# Patient Record
Sex: MALE | Race: WHITE | Employment: OTHER | URBAN - METROPOLITAN AREA
[De-identification: names, ages, dates, MRNs, and addresses within clinical notes are randomized per-mention and may not be internally consistent; named-entity substitution may affect disease eponyms.]

---

## 2021-10-19 ENCOUNTER — APPOINTMENT (OUTPATIENT)
Dept: CT IMAGING | Age: 86
DRG: 176 | End: 2021-10-19
Payer: MEDICARE

## 2021-10-19 ENCOUNTER — HOSPITAL ENCOUNTER (INPATIENT)
Age: 86
LOS: 1 days | Discharge: HOME | DRG: 176 | End: 2021-10-20
Attending: EMERGENCY MEDICINE | Admitting: INTERNAL MEDICINE
Payer: MEDICARE

## 2021-10-19 DIAGNOSIS — I26.94 MULTIPLE SUBSEGMENTAL PULMONARY EMBOLI WITHOUT ACUTE COR PULMONALE (HCC): Primary | ICD-10-CM

## 2021-10-19 PROBLEM — I26.99 PULMONARY EMBOLISM AND INFARCTION (HCC): Status: ACTIVE | Noted: 2021-10-19

## 2021-10-19 LAB
A/G RATIO: 1.1 (ref 1.1–2.2)
ALBUMIN SERPL-MCNC: 3.9 G/DL (ref 3.4–5)
ALP BLD-CCNC: 44 U/L (ref 40–129)
ALT SERPL-CCNC: 22 U/L (ref 10–40)
ANION GAP SERPL CALCULATED.3IONS-SCNC: 13 MMOL/L (ref 3–16)
APTT: 28.5 SEC (ref 26.2–38.6)
APTT: 88.4 SEC (ref 26.2–38.6)
APTT: >248 SEC (ref 26.2–38.6)
AST SERPL-CCNC: 18 U/L (ref 15–37)
BASOPHILS ABSOLUTE: 0.2 K/UL (ref 0–0.2)
BASOPHILS RELATIVE PERCENT: 2.8 %
BILIRUB SERPL-MCNC: 0.6 MG/DL (ref 0–1)
BILIRUBIN URINE: NEGATIVE
BLOOD, URINE: NEGATIVE
BUN BLDV-MCNC: 15 MG/DL (ref 7–20)
CALCIUM SERPL-MCNC: 9.9 MG/DL (ref 8.3–10.6)
CHLORIDE BLD-SCNC: 96 MMOL/L (ref 99–110)
CLARITY: CLEAR
CO2: 22 MMOL/L (ref 21–32)
COLOR: YELLOW
CREAT SERPL-MCNC: 0.7 MG/DL (ref 0.8–1.3)
EKG ATRIAL RATE: 57 BPM
EKG DIAGNOSIS: NORMAL
EKG P AXIS: -29 DEGREES
EKG P-R INTERVAL: 172 MS
EKG Q-T INTERVAL: 466 MS
EKG QRS DURATION: 90 MS
EKG QTC CALCULATION (BAZETT): 453 MS
EKG R AXIS: -21 DEGREES
EKG T AXIS: 27 DEGREES
EKG VENTRICULAR RATE: 57 BPM
EOSINOPHILS ABSOLUTE: 0.2 K/UL (ref 0–0.6)
EOSINOPHILS RELATIVE PERCENT: 2.2 %
GFR AFRICAN AMERICAN: >60
GFR NON-AFRICAN AMERICAN: >60
GLOBULIN: 3.5 G/DL
GLUCOSE BLD-MCNC: 173 MG/DL (ref 70–99)
GLUCOSE BLD-MCNC: 179 MG/DL (ref 70–99)
GLUCOSE BLD-MCNC: 197 MG/DL (ref 70–99)
GLUCOSE URINE: NEGATIVE MG/DL
HCT VFR BLD CALC: 41.1 % (ref 40.5–52.5)
HEMOGLOBIN: 14.4 G/DL (ref 13.5–17.5)
KETONES, URINE: NEGATIVE MG/DL
LEUKOCYTE ESTERASE, URINE: NEGATIVE
LIPASE: 43 U/L (ref 13–60)
LYMPHOCYTES ABSOLUTE: 1.5 K/UL (ref 1–5.1)
LYMPHOCYTES RELATIVE PERCENT: 17.2 %
MCH RBC QN AUTO: 33.5 PG (ref 26–34)
MCHC RBC AUTO-ENTMCNC: 35 G/DL (ref 31–36)
MCV RBC AUTO: 95.8 FL (ref 80–100)
MICROSCOPIC EXAMINATION: NORMAL
MONOCYTES ABSOLUTE: 1.1 K/UL (ref 0–1.3)
MONOCYTES RELATIVE PERCENT: 12.3 %
NEUTROPHILS ABSOLUTE: 5.8 K/UL (ref 1.7–7.7)
NEUTROPHILS RELATIVE PERCENT: 65.5 %
NITRITE, URINE: NEGATIVE
PDW BLD-RTO: 13.2 % (ref 12.4–15.4)
PERFORMED ON: ABNORMAL
PERFORMED ON: ABNORMAL
PH UA: 7 (ref 5–8)
PLATELET # BLD: 240 K/UL (ref 135–450)
PMV BLD AUTO: 7.8 FL (ref 5–10.5)
POTASSIUM REFLEX MAGNESIUM: 3.7 MMOL/L (ref 3.5–5.1)
PRO-BNP: 208 PG/ML (ref 0–449)
PROTEIN UA: NEGATIVE MG/DL
RBC # BLD: 4.29 M/UL (ref 4.2–5.9)
SODIUM BLD-SCNC: 131 MMOL/L (ref 136–145)
SPECIFIC GRAVITY UA: 1.01 (ref 1–1.03)
TOTAL PROTEIN: 7.4 G/DL (ref 6.4–8.2)
TROPONIN: <0.01 NG/ML
URINE REFLEX TO CULTURE: NORMAL
URINE TYPE: NORMAL
UROBILINOGEN, URINE: 1 E.U./DL
WBC # BLD: 8.8 K/UL (ref 4–11)

## 2021-10-19 PROCEDURE — 6360000002 HC RX W HCPCS: Performed by: INTERNAL MEDICINE

## 2021-10-19 PROCEDURE — 2580000003 HC RX 258: Performed by: INTERNAL MEDICINE

## 2021-10-19 PROCEDURE — 93010 ELECTROCARDIOGRAM REPORT: CPT | Performed by: INTERNAL MEDICINE

## 2021-10-19 PROCEDURE — 96366 THER/PROPH/DIAG IV INF ADDON: CPT

## 2021-10-19 PROCEDURE — G0378 HOSPITAL OBSERVATION PER HR: HCPCS

## 2021-10-19 PROCEDURE — 81003 URINALYSIS AUTO W/O SCOPE: CPT

## 2021-10-19 PROCEDURE — 80053 COMPREHEN METABOLIC PANEL: CPT

## 2021-10-19 PROCEDURE — 83880 ASSAY OF NATRIURETIC PEPTIDE: CPT

## 2021-10-19 PROCEDURE — 6360000004 HC RX CONTRAST MEDICATION: Performed by: PHYSICIAN ASSISTANT

## 2021-10-19 PROCEDURE — 74176 CT ABD & PELVIS W/O CONTRAST: CPT

## 2021-10-19 PROCEDURE — 96365 THER/PROPH/DIAG IV INF INIT: CPT

## 2021-10-19 PROCEDURE — 85730 THROMBOPLASTIN TIME PARTIAL: CPT

## 2021-10-19 PROCEDURE — 6370000000 HC RX 637 (ALT 250 FOR IP): Performed by: PHYSICIAN ASSISTANT

## 2021-10-19 PROCEDURE — 6360000002 HC RX W HCPCS: Performed by: PHYSICIAN ASSISTANT

## 2021-10-19 PROCEDURE — 6370000000 HC RX 637 (ALT 250 FOR IP): Performed by: INTERNAL MEDICINE

## 2021-10-19 PROCEDURE — 99285 EMERGENCY DEPT VISIT HI MDM: CPT

## 2021-10-19 PROCEDURE — 84484 ASSAY OF TROPONIN QUANT: CPT

## 2021-10-19 PROCEDURE — 71260 CT THORAX DX C+: CPT

## 2021-10-19 PROCEDURE — 1200000000 HC SEMI PRIVATE

## 2021-10-19 PROCEDURE — 83036 HEMOGLOBIN GLYCOSYLATED A1C: CPT

## 2021-10-19 PROCEDURE — 85025 COMPLETE CBC W/AUTO DIFF WBC: CPT

## 2021-10-19 PROCEDURE — 83690 ASSAY OF LIPASE: CPT

## 2021-10-19 PROCEDURE — 93005 ELECTROCARDIOGRAM TRACING: CPT | Performed by: PHYSICIAN ASSISTANT

## 2021-10-19 PROCEDURE — 36415 COLL VENOUS BLD VENIPUNCTURE: CPT

## 2021-10-19 RX ORDER — DEXTROSE MONOHYDRATE 25 G/50ML
12.5 INJECTION, SOLUTION INTRAVENOUS PRN
Status: DISCONTINUED | OUTPATIENT
Start: 2021-10-19 | End: 2021-10-20 | Stop reason: HOSPADM

## 2021-10-19 RX ORDER — ATORVASTATIN CALCIUM 80 MG/1
80 TABLET, FILM COATED ORAL DAILY
Status: DISCONTINUED | OUTPATIENT
Start: 2021-10-20 | End: 2021-10-20 | Stop reason: HOSPADM

## 2021-10-19 RX ORDER — ONDANSETRON 4 MG/1
4 TABLET, ORALLY DISINTEGRATING ORAL EVERY 8 HOURS PRN
Qty: 20 TABLET | Refills: 0 | Status: SHIPPED | OUTPATIENT
Start: 2021-10-19 | End: 2021-10-19

## 2021-10-19 RX ORDER — ACETAMINOPHEN 650 MG/1
650 SUPPOSITORY RECTAL EVERY 6 HOURS PRN
Status: DISCONTINUED | OUTPATIENT
Start: 2021-10-19 | End: 2021-10-20 | Stop reason: HOSPADM

## 2021-10-19 RX ORDER — FAMOTIDINE 20 MG/1
20 TABLET, FILM COATED ORAL 2 TIMES DAILY
Status: DISCONTINUED | OUTPATIENT
Start: 2021-10-19 | End: 2021-10-20 | Stop reason: HOSPADM

## 2021-10-19 RX ORDER — DOCUSATE SODIUM 100 MG/1
100 CAPSULE, LIQUID FILLED ORAL 2 TIMES DAILY
Qty: 60 CAPSULE | Refills: 0 | Status: SHIPPED | OUTPATIENT
Start: 2021-10-19 | End: 2021-10-19

## 2021-10-19 RX ORDER — DEXTROSE MONOHYDRATE 50 MG/ML
100 INJECTION, SOLUTION INTRAVENOUS PRN
Status: DISCONTINUED | OUTPATIENT
Start: 2021-10-19 | End: 2021-10-20 | Stop reason: HOSPADM

## 2021-10-19 RX ORDER — INSULIN LISPRO 100 [IU]/ML
0-6 INJECTION, SOLUTION INTRAVENOUS; SUBCUTANEOUS NIGHTLY
Status: DISCONTINUED | OUTPATIENT
Start: 2021-10-19 | End: 2021-10-20 | Stop reason: HOSPADM

## 2021-10-19 RX ORDER — HEPARIN SODIUM 1000 [USP'U]/ML
40 INJECTION, SOLUTION INTRAVENOUS; SUBCUTANEOUS PRN
Status: DISCONTINUED | OUTPATIENT
Start: 2021-10-19 | End: 2021-10-20 | Stop reason: HOSPADM

## 2021-10-19 RX ORDER — LOSARTAN POTASSIUM 100 MG/1
50 TABLET ORAL DAILY
Status: DISCONTINUED | OUTPATIENT
Start: 2021-10-20 | End: 2021-10-20 | Stop reason: HOSPADM

## 2021-10-19 RX ORDER — ATENOLOL 25 MG/1
25 TABLET ORAL DAILY
Status: DISCONTINUED | OUTPATIENT
Start: 2021-10-20 | End: 2021-10-20 | Stop reason: HOSPADM

## 2021-10-19 RX ORDER — HEPARIN SODIUM 1000 [USP'U]/ML
80 INJECTION, SOLUTION INTRAVENOUS; SUBCUTANEOUS PRN
Status: DISCONTINUED | OUTPATIENT
Start: 2021-10-19 | End: 2021-10-20 | Stop reason: HOSPADM

## 2021-10-19 RX ORDER — HEPARIN SODIUM 10000 [USP'U]/100ML
5-30 INJECTION, SOLUTION INTRAVENOUS CONTINUOUS
Status: DISCONTINUED | OUTPATIENT
Start: 2021-10-19 | End: 2021-10-20 | Stop reason: HOSPADM

## 2021-10-19 RX ORDER — INSULIN LISPRO 100 [IU]/ML
0-12 INJECTION, SOLUTION INTRAVENOUS; SUBCUTANEOUS
Status: DISCONTINUED | OUTPATIENT
Start: 2021-10-19 | End: 2021-10-20 | Stop reason: HOSPADM

## 2021-10-19 RX ORDER — HYDROCODONE BITARTRATE AND ACETAMINOPHEN 5; 325 MG/1; MG/1
1 TABLET ORAL EVERY 6 HOURS PRN
Qty: 10 TABLET | Refills: 0 | Status: SHIPPED | OUTPATIENT
Start: 2021-10-19 | End: 2021-10-19

## 2021-10-19 RX ORDER — POLYETHYLENE GLYCOL 3350 17 G/17G
17 POWDER, FOR SOLUTION ORAL DAILY PRN
Status: DISCONTINUED | OUTPATIENT
Start: 2021-10-19 | End: 2021-10-20 | Stop reason: HOSPADM

## 2021-10-19 RX ORDER — HYDROCODONE BITARTRATE AND ACETAMINOPHEN 5; 325 MG/1; MG/1
1 TABLET ORAL ONCE
Status: COMPLETED | OUTPATIENT
Start: 2021-10-19 | End: 2021-10-19

## 2021-10-19 RX ORDER — ONDANSETRON 4 MG/1
4 TABLET, ORALLY DISINTEGRATING ORAL ONCE
Status: COMPLETED | OUTPATIENT
Start: 2021-10-19 | End: 2021-10-19

## 2021-10-19 RX ORDER — ASPIRIN 81 MG/1
81 TABLET, CHEWABLE ORAL DAILY
Status: DISCONTINUED | OUTPATIENT
Start: 2021-10-20 | End: 2021-10-20 | Stop reason: HOSPADM

## 2021-10-19 RX ORDER — ACETAMINOPHEN 325 MG/1
650 TABLET ORAL EVERY 6 HOURS PRN
Status: DISCONTINUED | OUTPATIENT
Start: 2021-10-19 | End: 2021-10-20 | Stop reason: HOSPADM

## 2021-10-19 RX ORDER — NICOTINE POLACRILEX 4 MG
15 LOZENGE BUCCAL PRN
Status: DISCONTINUED | OUTPATIENT
Start: 2021-10-19 | End: 2021-10-20 | Stop reason: HOSPADM

## 2021-10-19 RX ORDER — ONDANSETRON 2 MG/ML
4 INJECTION INTRAMUSCULAR; INTRAVENOUS EVERY 6 HOURS PRN
Status: DISCONTINUED | OUTPATIENT
Start: 2021-10-19 | End: 2021-10-20 | Stop reason: HOSPADM

## 2021-10-19 RX ORDER — LIDOCAINE 50 MG/G
1 PATCH TOPICAL DAILY
Qty: 30 PATCH | Refills: 0 | Status: SHIPPED | OUTPATIENT
Start: 2021-10-19 | End: 2021-10-19

## 2021-10-19 RX ORDER — SODIUM CHLORIDE 0.9 % (FLUSH) 0.9 %
5-40 SYRINGE (ML) INJECTION PRN
Status: DISCONTINUED | OUTPATIENT
Start: 2021-10-19 | End: 2021-10-20 | Stop reason: HOSPADM

## 2021-10-19 RX ORDER — SODIUM CHLORIDE 0.9 % (FLUSH) 0.9 %
5-40 SYRINGE (ML) INJECTION EVERY 12 HOURS SCHEDULED
Status: DISCONTINUED | OUTPATIENT
Start: 2021-10-19 | End: 2021-10-20 | Stop reason: HOSPADM

## 2021-10-19 RX ORDER — SODIUM CHLORIDE 9 MG/ML
25 INJECTION, SOLUTION INTRAVENOUS PRN
Status: DISCONTINUED | OUTPATIENT
Start: 2021-10-19 | End: 2021-10-20 | Stop reason: HOSPADM

## 2021-10-19 RX ORDER — HEPARIN SODIUM 1000 [USP'U]/ML
80 INJECTION, SOLUTION INTRAVENOUS; SUBCUTANEOUS ONCE
Status: COMPLETED | OUTPATIENT
Start: 2021-10-19 | End: 2021-10-19

## 2021-10-19 RX ORDER — ONDANSETRON 4 MG/1
4 TABLET, ORALLY DISINTEGRATING ORAL EVERY 8 HOURS PRN
Status: DISCONTINUED | OUTPATIENT
Start: 2021-10-19 | End: 2021-10-20 | Stop reason: HOSPADM

## 2021-10-19 RX ADMIN — HEPARIN SODIUM 12 UNITS/KG/HR: 10000 INJECTION, SOLUTION INTRAVENOUS at 23:49

## 2021-10-19 RX ADMIN — HEPARIN SODIUM 9070 UNITS: 1000 INJECTION INTRAVENOUS; SUBCUTANEOUS at 12:33

## 2021-10-19 RX ADMIN — IOPAMIDOL 75 ML: 755 INJECTION, SOLUTION INTRAVENOUS at 10:55

## 2021-10-19 RX ADMIN — HEPARIN SODIUM 18 UNITS/KG/HR: 10000 INJECTION, SOLUTION INTRAVENOUS at 12:34

## 2021-10-19 RX ADMIN — FAMOTIDINE 20 MG: 20 TABLET, FILM COATED ORAL at 20:48

## 2021-10-19 RX ADMIN — INSULIN LISPRO 1 UNITS: 100 INJECTION, SOLUTION INTRAVENOUS; SUBCUTANEOUS at 20:49

## 2021-10-19 RX ADMIN — FAMOTIDINE 20 MG: 20 TABLET, FILM COATED ORAL at 15:29

## 2021-10-19 RX ADMIN — HYDROCODONE BITARTRATE AND ACETAMINOPHEN 1 TABLET: 5; 325 TABLET ORAL at 07:53

## 2021-10-19 RX ADMIN — SODIUM CHLORIDE, PRESERVATIVE FREE 10 ML: 5 INJECTION INTRAVENOUS at 20:48

## 2021-10-19 RX ADMIN — INSULIN LISPRO 2 UNITS: 100 INJECTION, SOLUTION INTRAVENOUS; SUBCUTANEOUS at 17:32

## 2021-10-19 RX ADMIN — ONDANSETRON 4 MG: 4 TABLET, ORALLY DISINTEGRATING ORAL at 07:53

## 2021-10-19 ASSESSMENT — ENCOUNTER SYMPTOMS
ABDOMINAL PAIN: 0
BACK PAIN: 1
COUGH: 0
RESPIRATORY NEGATIVE: 1
DIARRHEA: 0
COLOR CHANGE: 0
VOMITING: 0
CONSTIPATION: 0
SHORTNESS OF BREATH: 0
NAUSEA: 0
CHEST TIGHTNESS: 0

## 2021-10-19 ASSESSMENT — PAIN SCALES - GENERAL
PAINLEVEL_OUTOF10: 0
PAINLEVEL_OUTOF10: 1
PAINLEVEL_OUTOF10: 1
PAINLEVEL_OUTOF10: 0
PAINLEVEL_OUTOF10: 0

## 2021-10-19 ASSESSMENT — PAIN DESCRIPTION - PAIN TYPE: TYPE: ACUTE PAIN

## 2021-10-19 ASSESSMENT — PAIN DESCRIPTION - ORIENTATION: ORIENTATION: RIGHT

## 2021-10-19 ASSESSMENT — PAIN DESCRIPTION - LOCATION: LOCATION: FLANK

## 2021-10-19 NOTE — PROGRESS NOTES
Pt. To room 3319 from ED via wheelchair. Pt. VSS, patient denies pain, patient oriented to room. Pt. Updated on POC, denies questions. Pt. Given toiletries, denies further needs. Bed in lowest position, bed alarm activated, call light and bedside table within reach.

## 2021-10-19 NOTE — H&P
HOSPITALISTS HISTORY AND PHYSICAL    10/19/2021 12:50 PM    Patient Information:  Coni Rothman is a 82 y.o. male [id number]  PCP:  No primary care provider on file. (Tel: None )    Chief complaint:    Chief Complaint   Patient presents with    Flank Pain     Pt to ER from home for complaint of R sided flank pain that sometimes radiates across back. Pt reports pain is worse with movement, such as standing out of chair. Pain started approx week ago, denies injury to area. Reports some pain with urination 3 days ago, but has since resolved. Denies N/T to RLE. History of Present Illness:  Kit Runrao is a 82 y.o. male with history of hypertension, hyperlipidemia, diabetes mellitus who presented to ED with complaints of back pain. Located in the right posterior chest wall, worse with touch, movement and while standing up. Symptoms started a week ago, also reports pain with urination which started 3 days ago which eventually resolved. Has bilateral leg edema going on for months with recent worsening. Met with pts' wife out side is room in ED. She reports that he has been very depressed lately after two of his golf friends passed away few months ago, and has been without any activity at home or outside home. Pt apparently is always either sitting in couch or sleeping. In ED, patient is hemodynamically stable. Labs unremarkable except for sodium level of 131. Urinalysis clear. CT abdomen/pelvis suggestive of cholelithiasis and diverticulosis. CT chest showed multiple bilateral pulmonary emboli without signs of right heart strain. Stable respiratory status on room air. Patient was started on heparin drip in ED. History obtained from patient and ED provider.     REVIEW OF SYSTEMS:   Constitutional: Negative for fever,chills or night sweats  ENT: Negative for rhinorrhea, epistaxis, hoarseness, sore throat. Respiratory: Negative for shortness of breath,wheezing, + right posterior chest pain  Cardiovascular: Negative for chest pain, palpitations , + LE edema   Gastrointestinal: Negative for nausea, vomiting, diarrhea  Genitourinary: Negative for polyuria, dysuria   Hematologic/Lymphatic: Negative for bleeding tendency, easy bruising  Musculoskeletal: Negative for myalgias and arthralgias  Neurologic: Negative for confusion,dysarthria. Skin: Negative for itching,rash  Psychiatric: Negative for depression,anxiety, agitation. Endocrine: Negative for polydipsia,polyuria,heat /cold intolerance. Past Medical History:   has a past medical history of Diabetes mellitus ([facility] [address] Hyperlipidemia, and Hypertension. Past Surgical History:   has a past surgical history that includes angioplasty. Medications:  No current facility-administered medications on file prior to encounter. Current Outpatient Medications on File Prior to Encounter   Medication Sig Dispense Refill    metFORMIN (GLUCOPHAGE) 500 MG tablet Take 500 mg by mouth daily (with breakfast)       atorvastatin (LIPITOR) 80 MG tablet Take 80 mg by mouth daily      atenolol (TENORMIN) 25 MG tablet Take 25 mg by mouth daily      losartan (COZAAR) 50 MG tablet Take 50 mg by mouth daily      aspirin 81 MG chewable tablet Take 81 mg by mouth daily      [DISCONTINUED] vitamin D (CHOLECALCIFEROL) 1000 UNIT TABS tablet Take 1,000 Units by mouth daily         Allergies:  No Known Allergies     Social History:  Patient Lives at home. reports that he has quit smoking. He has never used smokeless tobacco. He reports that he does not drink alcohol and does not use drugs. Family History:  family history is not on file. Physical Exam:  BP (!) 136/57   Pulse 59   Temp 98.1 °F (36.7 °C) (Oral)   Resp 18   Ht 6' (1.829 m)   Wt 250 lb (113.4 kg)   SpO2 96%   BMI 33.91 kg/m²     General appearance:  Obese elderly male, Appears comfortable. Well nourished  Eyes: Sclera clear, pupils equal  ENT: Moist mucus membranes, no thrush. Trachea midline. Cardiovascular: Regular rhythm, normal S1, S2. No murmur, gallop, rub. 2+ pitting bilateral LE edema  Respiratory: Clear to auscultation bilaterally, no wheeze, good inspiratory effort  Gastrointestinal: Abdomen soft, non-tender, not distended, normal bowel sounds  Musculoskeletal: No cyanosis in digits, neck supple, + tenderness of rt posterior chest wall   Neurology: Cranial nerves grossly intact. Alert and oriented in time, place and person. No speech or motor deficits  Psychiatry: Appropriate affect. Not agitated  Skin: Warm, dry, normal turgor, no rash  Brisk capillary refill, peripheral pulses palpable     Labs:  CBC:   Lab Results   Component Value Date    WBC 8.8 10/19/2021    RBC 4.29 10/19/2021    HGB 14.4 10/19/2021    HCT 41.1 10/19/2021    MCV 95.8 10/19/2021    MCH 33.5 10/19/2021    MCHC 35.0 10/19/2021    RDW 13.2 10/19/2021     10/19/2021    MPV 7.8 10/19/2021     BMP:    Lab Results   Component Value Date     10/19/2021    K 3.7 10/19/2021    CL 96 10/19/2021    CO2 22 10/19/2021    BUN 15 10/19/2021    CREATININE 0.7 10/19/2021    CALCIUM 9.9 10/19/2021    GFRAA >60 10/19/2021    LABGLOM >60 10/19/2021    GLUCOSE 179 10/19/2021     CT CHEST PULMONARY EMBOLISM W CONTRAST   Final Result   Multiple acute bilateral pulmonary emboli. Findings were discussed with Josee Miller at 11:38 am on 10/19/2021. CT ABDOMEN PELVIS WO CONTRAST Additional Contrast? None   Final Result   1. Cholelithiasis. 2. Diverticulosis. EKG: Normal sinus rhythm, heart rate 57, no acute ST-T wave changes  I visualized CXR images and EKG strips    Discussed case  With pt, pt's wife and ED provider    Problem List  Active Problems:    * No active hospital problems. *  Resolved Problems:    * No resolved hospital problems.  *        Assessment/Plan:     Multiple acute bilateral pulmonary embolii  Stable resp status on room air  Started on heparin drip in ED  Likely provoked due to sedentary lifestyle  Will order bilateral lower extremity Dopplers to rule out DVTs  No evidence of right heart strain on CT chest  Echo ordered given LE edema  Pharmacy consulted to assist with [facility] choice based on co-pay    Rt posterior chest wall pain  Likely pleuritic from acute PE  Tylenol prn. Incentive spirometry    B/l LE edema  Ruling out DVT  Echo to rule out cardiac cause  UA with microscopy, prot/creat ratio pending  Renal parameters and LFTs wnl. Type II DM- POC blood sugars, med dose SSI to cover  Hypertension  Hyperlipidemia    DVT prophylaxis Heparin drip  Code status full code  Diet low-carb diet  IV access peripheral IV  Akhtar Catheter no    Admit as inpatient. I anticipate hospitalization spanning less than two midnights for investigation and treatment of the above medically necessary diagnoses. Please note that some part of this chart was generated using Dragon dictation software. Although every effort was made to ensure the accuracy of this automated transcription, some errors in transcription may have occurred inadvertently. If you may need any clarification, please do not hesitate to contact me through [facility].        Kady Garza MD    10/19/2021 12:50 PM

## 2021-10-19 NOTE — ED PROVIDER NOTES
[address] [facility] Emergency Department      Pt Name: Erin Suarez  MRN: [id number]  [place] 1935  Date of evaluation: 10/19/2021  Provider: Lenard Polo MD  I independently performed a history and physical on Erin Suarez. All diagnostic, treatment, and disposition decisions were made by myself in conjunction with the advanced practice provider. HPI: Erin Suarez presented with   Chief Complaint   Patient presents with    Flank Pain     Pt to ER from home for complaint of R sided flank pain that sometimes radiates across back. Pt reports pain is worse with movement, such as standing out of chair. Pain started approx week ago, denies injury to area. Reports some pain with urination 3 days ago, but has since resolved. Denies N/T to RLE. Erin Suarez has a past medical history of Diabetes mellitus ([address] Hyperlipidemia, and Hypertension. He has a past surgical history that includes angioplasty. No current facility-administered medications on file prior to encounter.      Current Outpatient Medications on File Prior to Encounter   Medication Sig Dispense Refill    metFORMIN (GLUCOPHAGE) 500 MG tablet Take 500 mg by mouth daily (with breakfast)       atorvastatin (LIPITOR) 80 MG tablet Take 80 mg by mouth daily      atenolol (TENORMIN) 25 MG tablet Take 25 mg by mouth daily      losartan (COZAAR) 50 MG tablet Take 50 mg by mouth daily      aspirin 81 MG chewable tablet Take 81 mg by mouth daily      [DISCONTINUED] vitamin D (CHOLECALCIFEROL) 1000 UNIT TABS tablet Take 1,000 Units by mouth daily       PHYSICAL EXAM  Vitals: BP (!) 136/57   Pulse 59   Temp 98.1 °F (36.7 °C) (Oral)   Resp 18   Ht 6' (1.829 m)   Wt 250 lb (113.4 kg)   SpO2 96%   BMI 33.91 kg/m²   Constitutional:  82 y.o. male alert  HENT:  Atraumatic, oral mucosa moist  Neck:  No visible JVD, supple  Chest/Lungs:  Respiratory effort normal, clear, regular  Abdomen:  Non-distended  Back:  No gross deformity  Extremities:  Normal tone and perfusion, symmetric pedal edema    Medical Decision Making and Plan: Briefly, this is an 82 y.o.male who presented with concern for back pain. Wife reports severe dyspnea noted this past week, pain worsened with movement but not getting better. Had booster dose Pfizer on 10/5. Noted to have PE. Anticoagulation initiated. Plan is to admit for further care. [facility], please see documentation by advanced practice provider GERALDO Soares.     Labs Reviewed   COMPREHENSIVE METABOLIC PANEL W/ REFLEX TO MG FOR LOW K - Abnormal; Notable for the following components:       Result Value    Sodium 131 (*)     Chloride 96 (*)     Glucose 179 (*)     CREATININE 0.7 (*)     All other components within normal limits    Narrative:     Performed at:  [facility]  [place]   Phone [phone]   URINE RT REFLEX TO CULTURE    Narrative:     Performed at:  [facility]  [place]   Phone [phone]   CBC WITH AUTO DIFFERENTIAL    Narrative:     Performed at:  [facility] [address], [employer]   Phone [phone]   LIPASE    Narrative:     Performed at:  [facility]  [place]   Phone [phone] PEPTIDE    Narrative:     Performed at:  [facility]  [place]   Phone [phone]   TROPONIN    Narrative:     Performed at:  [facility]  [employer], [employer]   Phone [phone]   APTT    Narrative:     Performed at:  [facility]  [place]   Phone [phone]   CBC   APTT   APTT     RADIOLOGY: Plain x-rays were viewed by me:   CT CHEST PULMONARY EMBOLISM W CONTRAST   Final Result   Multiple acute bilateral pulmonary emboli. Findings were discussed with Gill Elmore at 11:38 am on 10/19/2021. CT ABDOMEN PELVIS WO CONTRAST Additional Contrast? None   Final Result   1. Cholelithiasis. 2. Diverticulosis. EKG:  Read by me in the absence of a cardiologist shows: Sinus bradycardia, rate 57, QRS duration normal, axis leftward, LVH, nonspecific ST-T wave abnormality, prior EKG not available    CRITICAL CARE:  Total critical care time of 31 minutes (excludes any time for procedures). This includes but not limited to vital sign monitoring, medication, clinical response to medications, interpretation of diagnostics, review of nursing notes, pertinent record review, discussions about patient condition, consultation time, documentation time. Critical care due to the patient's multiple PEs.     Medications administered:  Medications   heparin (porcine) injection 9,070 Units (has no administration in time range)   heparin (porcine) injection 4,540 Units (has no administration in time range)   heparin 25,000 units in dextrose 5% 250 mL (premix) infusion (18 Units/kg/hr × 113.4 kg IntraVENous New Bag 10/19/21 1234)   HYDROcodone-acetaminophen (NORCO) 5-325 MG per tablet 1 tablet (1 tablet Oral Given 10/19/21 0753)   ondansetron (ZOFRAN-ODT) disintegrating tablet 4 mg (4 mg Oral Given 10/19/21 0753)   iopamidol (ISOVUE-370) 76 % injection 75 mL (75 mLs IntraVENous Given 10/19/21 1055)   heparin (porcine) injection 9,070 Units (9,070 Units IntraVENous Given 10/19/21 1233)     FINAL IMPRESSION:    1. Multiple subsegmental pulmonary emboli without acute cor pulmonale ([address]       DISPOSITION Admitted 10/19/2021 12:59:51 PM       Jenny Colby MD  10/19/21 1949

## 2021-10-19 NOTE — ED PROVIDER NOTES
Alfred Chiang        Pt Name: Isidoro Joshi  MRN: [id number]  [place] 1935  Date of evaluation: 10/19/2021  Provider: GERALDO Lau  PCP: No primary care provider on file. Note Started: 7:57 AM EDT        I have seen and evaluated this patient with my supervising physician Brent Roberson MD.    [address] [place] [address]       Chief Complaint   Patient presents with    Flank Pain     Pt to ER from home for complaint of R sided flank pain that sometimes radiates across back. Pt reports pain is worse with movement, such as standing out of chair. Pain started approx week ago, denies injury to area. Reports some pain with urination 3 days ago, but has since resolved. Denies N/T to RLE. HISTORY OF PRESENT ILLNESS   (Location, Timing/Onset, Context/Setting, Quality, Duration, Modifying Factors, Severity, Associated Signs and Symptoms)  Note limiting factors. Chief Complaint: Right flank pain. Isidoro Joshi is a 82 y.o. male with past medical history of diabetes, hypertension and hyperlipidemia presents the ED with complaint of right flank pain. Patient states he has pain to his right flank that he states been ongoing for the past week. Patient states he has not had any falls or injuries. States pain to his right sided back seem to be worsened with movement, ambulation and palpation. Patient became concerned because he states he is having pain from what he is seems to be his kidney. Patient denies any history of kidney problems in the past.  Denies history of kidney stones. Patient dates 3 days ago he did have some slight dysuria but states that is since resolved. Denies any hematuria, frequency, urgency or testicular pain/swelling. Denies any changes in bowel movements. Denies any nausea or vomiting. Denies any chest pain or shortness of breath. Denies fever chills. Denies any rashes or lesions. Denies any radiation down the leg.   Denies bowel/bladder incontinence, retention, saddle esthesia or distal numbness/tingling. Patient states pain is aching rated 1/10 at this time but worsened when he stands or moves. Has been taking over-the-counter medication with minimal improvement of symptoms. Nursing Notes were all reviewed and agreed with or any disagreements were addressed in the HPI. REVIEW OF SYSTEMS    (2-9 systems for level 4, 10 or more for level 5)     Review of Systems   Constitutional: Negative for activity change, appetite change, chills and fever. Respiratory: Negative. Negative for cough, chest tightness and shortness of breath. Cardiovascular: Negative. Negative for chest pain, palpitations and leg swelling. Gastrointestinal: Negative for abdominal pain, constipation, diarrhea, nausea and vomiting. Genitourinary: Positive for flank pain. Negative for decreased urine volume, difficulty urinating, dysuria, enuresis, frequency, hematuria and urgency. Musculoskeletal: Positive for arthralgias, back pain, gait problem and myalgias. Negative for joint swelling, neck pain and neck stiffness. Skin: Negative for color change, pallor, rash and wound. Neurological: Negative for dizziness, light-headedness and headaches. Positives and Pertinent negatives as per HPI. Except as noted above in the ROS, all other systems were reviewed and negative.        PAST MEDICAL HISTORY     Past Medical History:   Diagnosis Date    Diabetes mellitus ([address]     Hyperlipidemia     Hypertension          SURGICAL HISTORY     Past Surgical History:   Procedure Laterality Date    ANGIOPLASTY           CURRENTMEDICATIONS       Current Discharge Medication List      CONTINUE these medications which have NOT CHANGED    Details   metFORMIN (GLUCOPHAGE) 500 MG tablet Take 500 mg by mouth daily (with breakfast)       atenolol (TENORMIN) 25 MG tablet Take 25 mg by mouth daily      losartan (COZAAR) 50 MG tablet Take 50 mg by mouth daily aspirin 81 MG chewable tablet Take 81 mg by mouth daily      atorvastatin (LIPITOR) 80 MG tablet Take 80 mg by mouth daily               ALLERGIES     Patient has no known allergies. FAMILYHISTORY     History reviewed. No pertinent family history. SOCIAL HISTORY       Social History     Tobacco Use    Smoking status: Former Smoker    Smokeless tobacco: Never Used   Substance Use Topics    Alcohol use: No    Drug use: No       SCREENINGS    Kay Coma Scale  Eye Opening: Spontaneous  Best Verbal Response: Oriented  Best Motor Response: Obeys commands  Kay Coma Scale Score: 15        PHYSICAL EXAM    (up to 7 for level 4, 8 or more for level 5)     ED Triage Vitals   BP Temp Temp Source Pulse Resp SpO2 Height Weight   10/19/21 0735 10/19/21 0732 10/19/21 0732 10/19/21 0735 10/19/21 0735 10/19/21 0735 10/19/21 0735 10/19/21 0735   (!) 150/64 98.1 °F (36.7 °C) Oral 65 14 95 % 6' (1.829 m) 250 lb (113.4 kg)       Physical Exam  Constitutional:       General: He is not in acute distress. Appearance: Normal appearance. He is well-developed. He is not ill-appearing, toxic-appearing or diaphoretic. HENT:      Head: Normocephalic and atraumatic. Right Ear: External ear normal.      Left Ear: External ear normal.   Eyes:      General:         Right eye: No discharge. Left eye: No discharge. Cardiovascular:      Rate and Rhythm: Normal rate and regular rhythm. Pulses: Normal pulses. Heart sounds: Normal heart sounds. No murmur heard. No friction rub. No gallop. Comments: 2+ radial pulses bilaterally. No pedal edema. No calf tenderness. No JVD. Pulmonary:      Effort: Pulmonary effort is normal. No respiratory distress. Breath sounds: Normal breath sounds. No stridor. No wheezing, rhonchi or rales. Chest:      Chest wall: No tenderness. Abdominal:      General: Abdomen is flat. Bowel sounds are normal. There is no distension.       Palpations: Abdomen is soft. There is no mass. Tenderness: There is no abdominal tenderness. There is no right CVA tenderness, left CVA tenderness, guarding or rebound. Negative signs include Mitchell's sign and McBurney's sign. Hernia: No hernia is present. Musculoskeletal:         General: Normal range of motion. Cervical back: Normal range of motion and neck supple. Comments: Patient is tender to palpation over the right lumbar paraspinal musculature. There is no midline tenderness of the cervical, thoracic or lumbar spine. No crepitus or step-off. No skin changes. No rashes or lesions. No evidence of zoster. There is no CVA tenderness. Pain reproducible with flexion extension of the lumbar spine. Pain also present with lateral flexion/rotation. There is full range of motion strength to the bilateral hips, knees and ankles. There is full plantar flexion dorsiflexion. Sensation intact light touch. 2+ patellar and Achilles reflexes bilaterally. Gait deferred. Skin:     General: Skin is warm and dry. Coloration: Skin is not pale. Findings: No erythema or rash. Neurological:      Mental Status: He is alert and oriented to person, place, and time.    Psychiatric:         Behavior: Behavior normal.         DIAGNOSTIC RESULTS   LABS:    Labs Reviewed   COMPREHENSIVE METABOLIC PANEL W/ REFLEX TO MG FOR LOW K - Abnormal; Notable for the following components:       Result Value    Sodium 131 (*)     Chloride 96 (*)     Glucose 179 (*)     CREATININE 0.7 (*)     All other components within normal limits    Narrative:     Performed at:  [facility] [address], [place], [address] [facility]   Phone [phone]   URINE RT REFLEX TO CULTURE    Narrative:     Performed at:  [facility] [address], [place], [facility]   Phone [phone]   CBC WITH AUTO DIFFERENTIAL    Narrative:     Performed at:  [facility]  [address] [facility], [address]   Phone [phone]   LIPASE    Narrative:     Performed at:  [facility]  [address] [facility] [address]   Phone [phone]   BRAIN NATRIURETIC PEPTIDE    Narrative:     Performed at:  [facility]  [address] [facility], [address]   Phone [phone]   TROPONIN    Narrative:     Performed at:  [facility]  [address] [facility], [address]   Phone [phone]   APTT    Narrative:     Performed at:  [facility]  [address] [facility], [address]   Phone [phone]   APTT   APTT   HEMOGLOBIN A1C   POCT GLUCOSE   POCT GLUCOSE       When ordered only abnormal lab results are displayed. All other labs were within normal range or not returned as of this dictation. EKG: When ordered, EKG's are interpreted by the Emergency Department Physician in the absence of a cardiologist.  Please see their note for interpretation of EKG. RADIOLOGY:   Non-plain film images such as CT, Ultrasound and MRI are read by the radiologist. Plain radiographic images are visualized and preliminarily interpreted by the ED Provider with the below findings:        Interpretation per the Radiologist below, if available at the time of this note:    CT CHEST PULMONARY EMBOLISM W CONTRAST   Final Result   Multiple acute bilateral pulmonary emboli. Findings were discussed with Andree Fitch at 11:38 am on 10/19/2021. CT ABDOMEN PELVIS WO CONTRAST Additional Contrast? None   Final Result   1. Cholelithiasis. 2. Diverticulosis. VL DUP LOWER EXTREMITY VENOUS RIGHT    (Results Pending)   VL DUP LOWER EXTREMITY VENOUS LEFT    (Results Pending)     No results found.         PROCEDURES   Unless otherwise noted below, none     Procedures    CRITICAL CARE TIME   The total critical care time spent while evaluating and treating this patient was 38 minutes. This excludes time spent doing separately billable procedures. This includes time at the bedside, data interpretation, medication management, obtaining critical history from collateral sources if the patient is unable to provide it directly, and physician consultation. Specifics of interventions taken and potentially life-threatening diagnostic considerations are listed above in the medical decision making.         CONSULTS:  None      EMERGENCY DEPARTMENT COURSE and DIFFERENTIAL DIAGNOSIS/MDM:   Vitals:    Vitals:    10/19/21 1146 10/19/21 1317 10/19/21 1400 10/19/21 1450   BP: (!) 136/57 (!) 123/52 (!) 164/75    Pulse: 59 50 57 57   Resp: 18 18 18    Temp:       TempSrc:   Oral    SpO2: 96% 94% 96%    Weight:       Height:           Patient was given the following medications:  Medications   heparin (porcine) injection 9,070 Units (has no administration in time range)   heparin (porcine) injection 4,540 Units (has no administration in time range)   heparin 25,000 units in dextrose 5% 250 mL (premix) infusion (18 Units/kg/hr × 113.4 kg IntraVENous New Bag 10/19/21 1234)   aspirin chewable tablet 81 mg (has no administration in time range)   atenolol (TENORMIN) tablet 25 mg (has no administration in time range)   atorvastatin (LIPITOR) tablet 80 mg (has no administration in time range)   losartan (COZAAR) tablet 50 mg (has no administration in time range)   sodium chloride flush 0.9 % injection 5-40 mL (has no administration in time range)   sodium chloride flush 0.9 % injection 5-40 mL (has no administration in time range)   0.9 % sodium chloride infusion (has no administration in time range)   ondansetron (ZOFRAN-ODT) disintegrating tablet 4 mg (has no administration in time range)     Or   ondansetron (ZOFRAN) injection 4 mg (has no administration in time range)   polyethylene glycol (GLYCOLAX) packet 17 g (has no administration in time range)   acetaminophen (TYLENOL) tablet 650 mg (has no administration in time range)     Or   acetaminophen (TYLENOL) suppository 650 mg (has no administration in time range)   famotidine (PEPCID) tablet 20 mg (has no administration in time range)   glucose (GLUTOSE) 40 % oral gel 15 g (has no administration in time range)   dextrose 50 % IV solution (has no administration in time range)   glucagon (rDNA) injection 1 mg (has no administration in time range)   dextrose 5 % solution (has no administration in time range)   insulin lispro (1 Unit Dial) 0-12 Units (has no administration in time range)   insulin lispro (1 Unit Dial) 0-6 Units (has no administration in time range)   HYDROcodone-acetaminophen (NORCO) 5-325 MG per tablet 1 tablet (1 tablet Oral Given 10/19/21 0753)   ondansetron (ZOFRAN-ODT) disintegrating tablet 4 mg (4 mg Oral Given 10/19/21 0753)   iopamidol (ISOVUE-370) 76 % injection 75 mL (75 mLs IntraVENous Given 10/19/21 1055)   heparin (porcine) injection 9,070 Units (9,070 Units IntraVENous Given 10/19/21 1233)           Patient is an 26-year-old male who presents the ED with complaint of some right mid back pain and right flank pain. Patient's pain seems to be worsened when he stands or when he moves. Does have slight reproducible component on exam here in the ED. Distal neurovascular intact. He is able to ambulate here in the emergency department. Patient states he did have some slight dysuria couple days ago. Has since resolved. Given history did obtain blood work and CT imaging. CBC showed normal white count, hemoglobin and platelets. CMP showed glucose of 179 otherwise unremarkable. Lipase normal.  Urinalysis showed no signs of infection. CT of the abdomen pelvis showed incidental cholelithiasis and diverticulosis without any signs of acute infection. Did go reevaluate patient but at this time his wife is not with him.   Patient initially denied any chest pain or shortness of breath but wife when discussing work-up here in the ED mention that patient has been significantly short of breath with exertion and ambulation over the past several days. Given the fact that patient has flank pain and significant shortness of breath with exertion did expand work-up and order CT of the chest as well as EKG, troponin and BNP. Troponin and BNP obtained and showed no acute abnormality. EKG interpreted by attending. CT of the chest did show multiple acute bilateral pulmonary emboli. Patient be started on heparin. Will require admission. Case discussed with hospital service who graciously agreed to accept patient for admission at this time. FINAL IMPRESSION      1. Multiple subsegmental pulmonary emboli without acute cor pulmonale ([facility] [address]          DISPOSITION/PLAN   DISPOSITION Admitted 10/19/2021 12:59:51 PM      PATIENT REFERRED TO:  No follow-up provider specified.     DISCHARGE MEDICATIONS:  Current Discharge Medication List          DISCONTINUED MEDICATIONS:  Current Discharge Medication List      STOP taking these medications       vitamin D (CHOLECALCIFEROL) 1000 UNIT TABS tablet Comments:   Reason for Stopping:                      (Please note that portions of this note were completed with a voice recognition program.  Efforts were made to edit the dictations but occasionally words are mis-transcribed.)    GERALDO Sears (electronically signed)          GERALDO Martin  10/19/21 4611

## 2021-10-20 VITALS
TEMPERATURE: 98 F | OXYGEN SATURATION: 94 % | WEIGHT: 249.5 LBS | HEIGHT: 72 IN | DIASTOLIC BLOOD PRESSURE: 69 MMHG | BODY MASS INDEX: 33.79 KG/M2 | HEART RATE: 64 BPM | SYSTOLIC BLOOD PRESSURE: 128 MMHG | RESPIRATION RATE: 18 BRPM

## 2021-10-20 PROBLEM — E66.9 OBESITY (BMI 30-39.9): Status: ACTIVE | Noted: 2021-10-20

## 2021-10-20 PROBLEM — E11.9 DM2 (DIABETES MELLITUS, TYPE 2) (HCC): Status: ACTIVE | Noted: 2021-10-20

## 2021-10-20 PROBLEM — E78.5 HYPERLIPIDEMIA: Status: ACTIVE | Noted: 2021-10-20

## 2021-10-20 PROBLEM — I10 HTN (HYPERTENSION): Status: ACTIVE | Noted: 2021-10-20

## 2021-10-20 PROBLEM — E87.1 HYPONATREMIA: Status: ACTIVE | Noted: 2021-10-20

## 2021-10-20 LAB
APTT: 76.5 SEC (ref 26.2–38.6)
APTT: 98.9 SEC (ref 26.2–38.6)
ESTIMATED AVERAGE GLUCOSE: 151.3 MG/DL
GLUCOSE BLD-MCNC: 141 MG/DL (ref 70–99)
GLUCOSE BLD-MCNC: 161 MG/DL (ref 70–99)
HBA1C MFR BLD: 6.9 %
LACTIC ACID: 1.6 MMOL/L (ref 0.4–2)
LV EF: 58 %
LVEF MODALITY: NORMAL
PERFORMED ON: ABNORMAL
PERFORMED ON: ABNORMAL

## 2021-10-20 PROCEDURE — G0378 HOSPITAL OBSERVATION PER HR: HCPCS

## 2021-10-20 PROCEDURE — 97530 THERAPEUTIC ACTIVITIES: CPT

## 2021-10-20 PROCEDURE — 36415 COLL VENOUS BLD VENIPUNCTURE: CPT

## 2021-10-20 PROCEDURE — 6360000002 HC RX W HCPCS: Performed by: INTERNAL MEDICINE

## 2021-10-20 PROCEDURE — 97165 OT EVAL LOW COMPLEX 30 MIN: CPT

## 2021-10-20 PROCEDURE — 2580000003 HC RX 258: Performed by: INTERNAL MEDICINE

## 2021-10-20 PROCEDURE — 96366 THER/PROPH/DIAG IV INF ADDON: CPT

## 2021-10-20 PROCEDURE — 93970 EXTREMITY STUDY: CPT

## 2021-10-20 PROCEDURE — 6370000000 HC RX 637 (ALT 250 FOR IP): Performed by: INTERNAL MEDICINE

## 2021-10-20 PROCEDURE — 97162 PT EVAL MOD COMPLEX 30 MIN: CPT

## 2021-10-20 PROCEDURE — 93306 TTE W/DOPPLER COMPLETE: CPT

## 2021-10-20 PROCEDURE — 83605 ASSAY OF LACTIC ACID: CPT

## 2021-10-20 PROCEDURE — 85730 THROMBOPLASTIN TIME PARTIAL: CPT

## 2021-10-20 RX ADMIN — HEPARIN SODIUM 12 UNITS/KG/HR: 10000 INJECTION, SOLUTION INTRAVENOUS at 06:44

## 2021-10-20 RX ADMIN — ATENOLOL 25 MG: 25 TABLET ORAL at 08:05

## 2021-10-20 RX ADMIN — ATORVASTATIN CALCIUM 80 MG: 80 TABLET, FILM COATED ORAL at 08:05

## 2021-10-20 RX ADMIN — INSULIN LISPRO 1 UNITS: 100 INJECTION, SOLUTION INTRAVENOUS; SUBCUTANEOUS at 12:28

## 2021-10-20 RX ADMIN — LOSARTAN POTASSIUM 50 MG: 100 TABLET, FILM COATED ORAL at 08:05

## 2021-10-20 RX ADMIN — ASPIRIN 81 MG 81 MG: 81 TABLET ORAL at 08:06

## 2021-10-20 RX ADMIN — SODIUM CHLORIDE, PRESERVATIVE FREE 10 ML: 5 INJECTION INTRAVENOUS at 08:06

## 2021-10-20 RX ADMIN — FAMOTIDINE 20 MG: 20 TABLET, FILM COATED ORAL at 08:05

## 2021-10-20 RX ADMIN — INSULIN LISPRO 2 UNITS: 100 INJECTION, SOLUTION INTRAVENOUS; SUBCUTANEOUS at 10:38

## 2021-10-20 RX ADMIN — HEPARIN SODIUM 10 UNITS/KG/HR: 10000 INJECTION, SOLUTION INTRAVENOUS at 08:35

## 2021-10-20 ASSESSMENT — PAIN SCALES - GENERAL
PAINLEVEL_OUTOF10: 0

## 2021-10-20 NOTE — PROGRESS NOTES
Aptt drawn at 6 am awaiting results. Results received heparin adjusted per order    0900:  Off the floor to ECHO

## 2021-10-20 NOTE — PROGRESS NOTES
Gayle Salguero MD  Perfect served   10/20/21 1:44 PM   [phone] Hospital or Facility: [facility] From: Ethan Moore RE: Harman Ng 1935 RM: [id number] Would you like the heparin d/greta or continue till ECHO results are back ? Need Callback: NO CALLBACK REQ 3A TELEMETRY  Unread     Ok to stop.

## 2021-10-20 NOTE — DISCHARGE SUMMARY
Hospital Medicine Discharge Summary    Patient: Manish Biswas     Gender: male  : 1935   Age: 82 y.o. MRN: [id number]    Admitting Physician: Breanne Mooney MD  Discharge Physician: Cadence Aguilar MD     Code Status: Full Code     Admit Date: 10/19/2021   Discharge Date:   10/20/21    Disposition:  Home    Discharge Diagnoses: Active Hospital Problems    Diagnosis Date Noted    HTN (hypertension) [I10] 10/20/2021    DM2 (diabetes mellitus, type 2) ([facility] [address] [E11.9] 10/20/2021    Hyperlipidemia [E78.5] 10/20/2021    Obesity (BMI 30-39. 9) [E66.9] 10/20/2021    Hyponatremia [E87.1] 10/20/2021    Pulmonary embolism and infarction [facility]) [I26.99] 10/19/2021       Follow-up appointments:  one week    Outpatient to do list: F/U with PCP    Condition at Discharge:  [facility]:   81 yo M with history DM 2, HTN, obesity, hyperlipidemia who came to ER with CP. Admitted as inpatient for acute BL PE. Started on Hep gtt. BL LE Doppler US with no DVT. Echo   -Normal left ventricle size, wall thickness, and systolic function with an   estimated ejection fraction of 55-60%. -The right ventricle is normal in size and function.   -Normal diastolic function. -Mild mitral regurgitation is present.   -The left atrium is mildly dilated. -Unable to estimate PA pressure due to incomplete TR jet. PT/OT recommend ed home PT/OT. Patient refused. Changed to Eliquis. Will f/u with PCP.     Discharge Medications:   Current Discharge Medication List      START taking these medications    Details   apixaban starter pack (ELIQUIS DVT/PE STARTER PACK) 5 MG TBPK tablet Take 1 tablet by mouth See Admin Instructions  Qty: 74 tablet, Refills: 0           Current Discharge Medication List        Current Discharge Medication List      CONTINUE these medications which have NOT CHANGED    Details   metFORMIN (GLUCOPHAGE) 500 MG tablet Take 500 mg by mouth daily (with breakfast)       atenolol (TENORMIN) 25 MG tablet Take 25 mg by mouth daily      losartan (COZAAR) 50 MG tablet Take 50 mg by mouth daily      aspirin 81 MG chewable tablet Take 81 mg by mouth daily      atorvastatin (LIPITOR) 80 MG tablet Take 80 mg by mouth daily           Current Discharge Medication List      STOP taking these medications       vitamin D (CHOLECALCIFEROL) 1000 UNIT TABS tablet Comments:   Reason for Stopping:                 Discharge Exam:    /69   Pulse 64   Temp 98 °F (36.7 °C) (Oral)   Resp 18   Ht 6' (1.829 m)   Wt 249 lb 8 oz (113.2 kg)   SpO2 94%   BMI 33.84 kg/m²   General appearance:  NAD  HEENT:   Normal cephalic, atraumatic, moist mucous membranes, no oropharyngeal erythema or exudate  Neck: Supple, trachea midline, no anterior cervical or SC LAD  Heart[de-identified] Normal s1/s2, RRR, no murmurs, gallops, or rubs. No leg edema  Lungs:  No use of accessory musclesNormal respiratory effort. Clear to auscultation, bilaterally without Rales/Wheezes/Rhonchi. Abdomen: Soft, non-tender, non-distended, bowel sounds present, no masses  Musculoskeletal:  No clubbing, no cyanosis, no edema  Skin: No lesion or masses  Neurologic:  Neurovascularly intact without any focal sensory/motor deficits. Cranial nerves: II-XII intact, grossly non-focal.  Psychiatric:  A & O x3  Neuro: Grossly intact, moves all four extremities     Labs:  For convenience and continuity at follow-up the following most recent labs are provided:    Lab Results   Component Value Date    WBC 8.8 10/19/2021    HGB 14.4 10/19/2021    HCT 41.1 10/19/2021    MCV 95.8 10/19/2021     10/19/2021     10/19/2021    K 3.7 10/19/2021    CL 96 10/19/2021    CO2 22 10/19/2021    BUN 15 10/19/2021    CREATININE 0.7 10/19/2021    CALCIUM 9.9 10/19/2021    ALKPHOS 44 10/19/2021    ALT 22 10/19/2021    AST 18 10/19/2021    BILITOT 0.6 10/19/2021    LABALBU 3.9 10/19/2021     No results found for: INR    Radiology:  CT ABDOMEN PELVIS WO CONTRAST Additional Contrast? None    Result Date: 10/19/2021  EXAMINATION: CT OF THE ABDOMEN AND PELVIS WITHOUT CONTRAST 10/19/2021 8:00 am TECHNIQUE: CT of the abdomen and pelvis was performed without the administration of intravenous contrast. Multiplanar reformatted images are provided for review. Dose modulation, iterative reconstruction, and/or weight based adjustment of the mA/kV was utilized to reduce the radiation dose to as low as reasonably achievable. COMPARISON: None. HISTORY: ORDERING SYSTEM PROVIDED HISTORY: flank pain TECHNOLOGIST PROVIDED HISTORY: Reason for exam:->flank pain Additional Contrast?->None Decision Support Exception - unselect if not a suspected or confirmed emergency medical condition->Emergency Medical Condition (MA) Reason for Exam: flank pain Acuity: Acute Type of Exam: Initial FINDINGS: Lower Chest: There is mild bibasilar dependent atelectasis. The pleural spaces are clear. Organs: The liver exhibits diffuse fatty infiltration. There is no urinary tract calculus or obstruction. The pancreas, spleen and adrenals are unremarkable. A tiny calcified gallstone is present within the gallbladder. GI/Bowel: There is no bowel dilatation, wall thickening or obstruction. The appendix is not visualized. Diverticular changes are present within the rectosigmoid region. Pelvis: The bladder and prostate are unremarkable. Peritoneum/Retroperitoneum: There is no free air, free fluid or intraperitoneal inflammatory change. There is no adenopathy. Bones/Soft Tissues: There is no acute fracture or aggressive osseous lesion. 1. Cholelithiasis. 2. Diverticulosis. CT CHEST PULMONARY EMBOLISM W CONTRAST    Result Date: 10/19/2021  EXAMINATION: CTA OF THE CHEST 10/19/2021 10:44 am TECHNIQUE: CTA of the chest was performed after the administration of intravenous contrast.  Multiplanar reformatted images are provided for review. MIP images are provided for review.  Dose modulation, iterative reconstruction, and/or weight based adjustment of the mA/kV was utilized to reduce the radiation dose to as low as reasonably achievable. COMPARISON: None. HISTORY: ORDERING SYSTEM PROVIDED HISTORY: sob - rib pain TECHNOLOGIST PROVIDED HISTORY: Reason for exam:->sob - rib pain Decision Support Exception - unselect if not a suspected or confirmed emergency medical condition->Emergency Medical Condition (MA) Reason for Exam: sob - rib pain Acuity: Unknown Type of Exam: Unknown FINDINGS: Pulmonary Arteries: There are multiple small partially occlusive filling defects within the right main and bilateral segmental/subsegmental pulmonary arterial vessels. The main pulmonary artery is normal in caliber. There is no evidence of right ventricular strain. Mediastinum: No evidence of mediastinal lymphadenopathy. The heart and pericardium demonstrate no acute abnormality. There is no acute abnormality of the thoracic aorta. Lungs/pleura: The lungs are without acute process. No focal consolidation or pulmonary edema. No evidence of pleural effusion or pneumothorax. Upper Abdomen: Limited images of the upper abdomen are unremarkable aside from a tiny gallstone within the gallbladder. . Soft Tissues/Bones: No acute bone or soft tissue abnormality. Multiple acute bilateral pulmonary emboli. Findings were discussed with Cris Mcgill at 11:38 am on 10/19/2021.      VL Extremity Venous Bilateral    Result Date: 10/20/2021  Vascular Lower Extremities DVT Study Procedure -- PRELIMINARY SONOGRAPHER REPORT --   Demographics   Patient Name      Priscille Fossa   Date of Study     10/20/2021          Gender              Male   Patient Number    [id number]          Date of Birth       1935   Visit Number      [id number]           Age                 82 year(s)   Accession Number  [id number]          Room Number         5124   Corporate ID      [id number]            Sonographer         Mag Schultz RVT   Ordering          Linda Moyer MD  Interpreting        [facility]  Physician                             Physician  Procedure Type of Study:   Veins:Lower Extremities DVT Study, VASC EXTREMITY VENOUS DUPLEX BILATERAL. Tech Comments Right There is no evidence of deep or superficial venous thrombosis involving the right lower extremity. The peroneal vein could be seen only proximal. Left There is no evidence of deep or superficial venous thrombosis involving the left lower extremity. The peroneal vein could be seen only proximal.      The patient was seen and examined on day of discharge and this discharge summary is in conjunction with any daily progress note from day of discharge. Time Spent on discharge is 45 minutes  in the examination, evaluation, counseling and review of medications and discharge plan. Note that more than 30 minutes was spent in preparing discharge papers, discussing discharge with patient, medication review, etc.       Signed:    Clemente Flores MD   10/20/2021      Thank you No primary care provider on file. for the opportunity to be involved in this patient's care.  If you have any questions or concerns please feel free to contact me at [address] [place] [address]

## 2021-10-20 NOTE — PROGRESS NOTES
1630:Discharge order in chart. Pt discharged home with family. ECHO results received and forwarded to provider, ok for pt to go home. IV out. Care plan and education complete. Denies any discomfort. Daughter and wife to provide transportation home.

## 2021-10-20 NOTE — PROGRESS NOTES
Occupational Therapy   Occupational Therapy Initial Assessment  Date: 10/20/2021   Patient Name: Diana Arriola  MRN: [id number]     : 1935    Date of Service: 10/20/2021    Discharge Recommendations:Fish Cristina scored a 20/24 on the AM-PAC ADL Inpatient form. Current research shows that an AM-PAC score of 18 or greater is typically associated with a discharge to the patient's home setting. Based on the patient's AM-PAC score, and their current ADL deficits, it is recommended that the patient have 2-3 sessions per week of Occupational Therapy at d/c to increase the patient's independence. At this time, this patient demonstrates the endurance and safety to discharge home with HOME (home vs OP services) and a follow up treatment frequency of 2-3x/wk. Please see assessment section for further patient specific details. If patient discharges prior to next session this note will serve as a discharge summary. Please see below for the latest assessment towards goals. HOME HEALTH CARE: LEVEL 3 SAFETY     - Initial home health evaluation to occur within 24-48 hours, in patient home   - Therapy evaluations in home within 24-48 hours of discharge; including DME and home safety   - Frontload therapy 5 days, then 3x a week   - Therapy to evaluate if patient has [address] needs for personal care   -  evaluation within 24-48 hours, includes evaluation of resources and insurance to determine AL, [place], LTC, and Medicaid options          OT Equipment Recommendations  Equipment Needed: Yes  Mobility Devices: ADL Assistive Devices  ADL Assistive Devices: Shower Chair with back    Assessment   Performance deficits / Impairments: Decreased functional mobility ; Decreased ADL status; Decreased safe awareness;Decreased cognition;Decreased endurance;Decreased balance;Decreased high-level IADLs  Treatment Diagnosis: Above deficits associated with PE  Prognosis: Good  Decision Making: Low Complexity  OT Education: OT Role;Plan of Care  Patient Education: Eval, discharge recommendations-patient verbalized understanding  Barriers to Learning: cognition, agitation with activity/cues  REQUIRES OT FOLLOW UP: Yes  Activity Tolerance  Activity Tolerance: Patient limited by fatigue;Treatment limited secondary to agitation  Safety Devices  Safety Devices in place: Yes  Type of devices: All fall risk precautions in place;Nurse notified;Call light within reach;Gait belt;Left in bed           Patient Diagnosis(es): The encounter diagnosis was Multiple subsegmental pulmonary emboli without acute cor pulmonale ([facility] [address] has a past medical history of Diabetes mellitus ([facility] [address] Hyperlipidemia, and Hypertension. has a past surgical history that includes angioplasty. Treatment Diagnosis: Above deficits associated with PE      Restrictions  Restrictions/Precautions  Restrictions/Precautions: Fall Risk (High fall risk)  Position Activity Restriction  Other position/activity restrictions: 82 y.o. male with history of hypertension, hyperlipidemia, diabetes mellitus who presented to ED with complaints of back pain. Located in the right posterior chest wall, worse with touch, movement and while standing up. Symptoms started a week ago, also reports pain with urination which started 3 days ago which eventually resolved. Has bilateral leg edema going on for months with recent worsening. Met with pts' wife out side is room in ED. She reports that he has been very depressed lately after two of his golf friends passed away few months ago, and has been without any activity at home or outside home. Pt apparently is always either sitting in couch or sleeping. In ED, patient is hemodynamically stable. Labs unremarkable except for sodium level of 131. Urinalysis clear. CT abdomen/pelvis suggestive of cholelithiasis and diverticulosis. CT chest showed multiple bilateral pulmonary emboli without signs of right heart strain.   Stable respiratory status on room air. Patient was started on heparin drip in ED.     Subjective   General  Chart Reviewed: Yes  Patient assessed for rehabilitation services?: Yes  Family / Caregiver Present: No  Diagnosis: PE  Subjective  Subjective: Pt supine in bed upon arrival, agreeable to evaluation  Patient Currently in Pain: Denies (intermittent back pain, but improved since admission)  Pain Assessment  Pain Assessment: 0-10  Pain Level: 0  Pre Treatment Pain Screening  Intervention List: Patient able to continue with treatment  Vital Signs  Pulse: 64  Heart Rate Source: Monitor  Resp: 18  BP: 128/69  BP Location: Right upper arm  MAP (mmHg): 89  Patient Position: Semi fowlers  Level of Consciousness: Alert (0)  Patient Currently in Pain: Denies (intermittent back pain, but improved since admission)  Oxygen Therapy  SpO2: 94 %  Pulse Oximeter Device Mode: Intermittent  Pulse Oximeter Device Location: Right;Finger  O2 Device: None (Room air)  Social/Functional History  Social/Functional History  Lives With: Spouse  Type of Home:  ([address] [place] [address])  Home Layout: One level  Home Access: Level entry  Bathroom Shower/Tub: Tub/Shower unit (Walk in tub)  Bathroom Toilet: Standard  Bathroom Equipment: Grab bars around toilet, Grab bars in shower  Bathroom Accessibility: Accessible  Home Equipment: Rolling walker, Cane, Grab bars  Receives Help From: Family  ADL Assistance: Independent  Homemaking Assistance: Independent  Homemaking Responsibilities: Yes  Ambulation Assistance: Independent  Transfer Assistance: Independent  Active : Yes  Mode of Transportation: Car  Occupation: Retired  Type of occupation: Manufactor agent  Leisure & Hobbies: Sit and read  Additional Comments: Pt reports no falls in past 3-6 months       Objective   Vision: Impaired  Vision Exceptions: Wears glasses for distance  Hearing: Exceptions to [facility]  Hearing Exceptions: Bilateral hearing aid    Orientation  Overall Orientation Status: Within Functional Limits  Observation/Palpation  Posture: Fair (Forward head and rounded shoulders)  Balance  Sitting Balance: Supervision  Standing Balance: Contact guard assistance (~50-60 feet without device (wide VAIBHAV, small step length, pt reaching for railings and furniture) Pt adamently declined used of AD (used spouse's RW to get into ED per pt))  Wheelchair Bed Transfers  Wheelchair/Bed - Technique: Ambulating  Equipment Used: Bed  Level of Asssistance: Contact guard assistance;Minimal assistance  ADL  Additional Comments: Pt became agitated with activity/mobility. Patient c/o people coming in/out of room. Unable to direct patient to ADL activities at time of evaluation. Pt reports transfering self to/from toilet in hospital  Tone RUE  RUE Tone: Normotonic  Tone LUE  LUE Tone: Normotonic  Coordination  Movements Are Fluid And Coordinated: No  Coordination and Movement description: Tremors (resting head and hand tremors noted)     Bed mobility  Supine to Sit: Stand by assistance  Sit to Supine: Stand by assistance  Transfers  Sit to stand: Stand by assistance  Stand to sit: Minimal assistance (pt quickly took posterior steps and quickly lowered self onto bed (LOB with pt landing on bed) \"I did that on purpose\" but pt lost balance posteriorly when attemting to sit)  Vision - Basic Assessment  Patient Visual Report: No visual complaint reported. Cognition  Overall Cognitive Status: Exceptions  Arousal/Alertness: Appropriate responses to stimuli  Following Commands:  Follows one step commands consistently  Safety Judgement: Decreased awareness of need for safety;Decreased awareness of need for assistance  Problem Solving: Decreased awareness of errors  Insights: Decreased awareness of deficits  Sequencing: Requires cues for some  Cognition Comment: Pt demonstrated impulsiveness w/ ambulation as noted by not wanting AD and during stand to sit as he tried sitting w/o help  Perception  Overall Perceptual Status: WFL     Sensation  Overall Sensation Status: WFL        LUE AROM (degrees)  LUE AROM : WNL  RUE AROM (degrees)  RUE AROM : WNL  LUE Strength  L Hand General: 5/5  RUE Strength  R Hand General: 5/5                   Plan   Plan  Times per week: 3-5  Times per day: Daily  Current Treatment Recommendations: Strengthening, Balance Training, Functional Mobility Training, Endurance Training, Safety Education & Training, Self-Care / ADL, Equipment Evaluation, Education, & procurement, Patient/Caregiver Education & Training, Home Management Training      AM-PAC Score        AM-[facility] Inpatient Daily Activity Raw Score: 20 (10/20/21 1239)  AM-PAC Inpatient ADL T-Scale Score : 42.03 (10/20/21 [facility])  ADL Inpatient CMS 0-100% Score: 38.32 (10/20/21 [facility])  ADL Inpatient CMS G-Code Modifier : Awanda Leodan (10/20/21 [facility])    Goals  Short term goals  Time Frame for Short term goals: Discharge  Short term goal 1: Functional ADL transfers mod I  Short term goal 2: Functional mobility mod I  Short term goal 3: LB ADLs mod I  Short term goal 4: Stand level ADL 8-10 minutes mod I  Patient Goals   Patient goals : Home       Therapy Time   Individual Concurrent Group Co-treatment   Time In 5401         Time Out 3907         Minutes 32              Timed Code Treatment Minutes:  17 Minutes    Total Treatment Minutes:  [address]/L [id number]

## 2021-10-20 NOTE — PROGRESS NOTES
CLINICAL PHARMACY NOTE: MEDS TO BEDS    Total # of Prescriptions Filled: 1   The following medications were delivered to the patient:  · Eliquis starter pack 5    Additional Documentation:    Delivered to Patient=signed  Dany Doan CPhT

## 2021-10-20 NOTE — ACP (ADVANCE CARE PLANNING)
Advanced Care Planning Note. Purpose of Encounter: Advanced care planning in light of pulmonary embolism  Parties In Attendance: Patient  Decisional Capacity: Yes  Subjective: Patient denies CP and SOB  Objective: Lactic acid 1.6  Goals of Care Determination: Patient wants full support (CPR, vent, surgery, HD, no trach, no PEG)  Plan:  Hep gtt. LE Doppler. Echo  Code Status: Full code   Time spent on Advanced care Plannin minutes  Advanced Care Planning Documents: Completed advanced directives on chart, wife is the POA.     Bernarda Padilla MD  10/20/2021 12:55 PM

## 2021-10-20 NOTE — PLAN OF CARE
Problem: Falls - Risk of:  Goal: Will remain free from falls  Description: Will remain free from falls  10/20/2021 0109 by Jessica Guerrero RN  Outcome: Met This Shift  Note: Pt is wearing the fall bracelet and yellow nonskid socks. Bed is in lowest position, locked, side rails up 2/4, and call light is within reach. Pt informed of fall risks, verbalizes understanding, and agrees to ask for help to ambulate. Will monitor. Problem: Falls - Risk of:  Goal: Absence of physical injury  Description: Absence of physical injury  10/20/2021 0109 by Jessica Guerrero RN  Outcome: Met This Shift     Problem: Skin Integrity:  Goal: Will show no infection signs and symptoms  Description: Will show no infection signs and symptoms  10/20/2021 0109 by Jessica Guerrero RN  Outcome: Met This Shift     Problem: Skin Integrity:  Goal: Absence of new skin breakdown  Description: Absence of new skin breakdown  10/20/2021 0109 by Jessica Guerrero RN  Outcome: Met This Shift  Note: No breakdown noted on this shift. Pt repositions self in bed frequently. Pt continent and calls appropriately. Will monitor.

## 2021-10-20 NOTE — CONSULTS
Pharmacy to check patient copays for Eliquis/Xarelto:    Copay for patient will be: $47/month      Pharmacy will continue to follow the decision for anticoagulation and  the patient if appropriate.        Meghan Bhatt, PharmD, [facility]  Clinical Pharmacist  [id number]

## 2021-10-20 NOTE — DISCHARGE INSTR - COC
Continuity of Care Form    Patient Name: Delmas Runner   :  1935  MRN:  [id number]    Admit date:  10/19/2021  Discharge date:  ***    Code Status Order: Full Code   Advance Directives:      Admitting Physician:  Radha Manning MD  PCP: No primary care provider on file. Discharging Nurse: [facility] Unit/Room#: 3AN-3319/3319-01  Discharging Unit Phone Number: ***    Emergency Contact:   Extended Emergency Contact Information  Primary Emergency Contact: Rosemary Cristina  Home Phone: [phone]  Relation: Spouse  Secondary Emergency Contact: Darcie Mistry  Home Phone: [phone]  Relation: Child    Past Surgical History:  Past Surgical History:   Procedure Laterality Date    ANGIOPLASTY         Immunization History:   Immunization History   Administered Date(s) Administered    COVID-19, KELIN Dumont, 30mcg/0.3mL 2021, 2021, 10/05/2021       Active Problems:  Patient Active Problem List   Diagnosis Code    Pulmonary embol[facility] and [facility] I26.99    HTN (hypertension) I10    DM2 (diabetes mellitus, type 2) ([facility] E11.9    Hyperlipidemia E78.5    Obesity (BMI 30-39. 9) E66.9    Hyponatremia E87.1       Isolation/Infection:   Isolation            No Isolation          Patient Infection Status       None to display            Nurse Assessment:  Last Vital Signs: BP (!) 150/94   Pulse 59   Temp 97.2 °F (36.2 °C) (Temporal)   Resp 16   Ht 6' (1.829 m)   Wt 249 lb 8 oz (113.2 kg)   SpO2 96%   BMI 33.84 kg/m²     Last documented pain score (0-10 scale): Pain Level: 0  Last Weight:   Wt Readings from Last 1 Encounters:   10/20/21 249 lb 8 oz (113.2 kg)     Mental Status:  {IP PT MENTAL STATUS::::0}    IV Access:  { MEDINA IV ACCESS:991703314:::0}    Nursing Mobility/ADLs:  Walking   {CHP DME ADLs:029779220:::0}  Transfer  {CHP DME ADLs:106431303:::0}  Bathing  {CHP DME ADLs:048605270:::0}  Dressing  {CHP DME ADLs:591691895:::0}  Toileting  {CHP DME ADLs:687702748:::0}  Feeding  {CHP DME ADLs:267267556:::0}  Med Admin  {CHP DME ADLs:786424468:::0}  Med Delivery   { MEDINA MED Delivery:678346070:::0}    Wound Care Documentation and Therapy:        Elimination:  Continence: Bowel: {YES / LA:44662}  Bladder: {YES / ID:38409}  Urinary Catheter: {Urinary Catheter:404538001:::0}   Colostomy/Ileostomy/Ileal Conduit: {YES / IZ:88667}       Date of Last BM: ***    Intake/Output Summary (Last 24 hours) at 10/20/2021 0639  Last data filed at 10/20/2021 8133  Gross per 24 hour   Intake 260 ml   Output 1760 ml   Net -1500 ml     I/O last 3 completed shifts: In: 250 [P.O.:240;  I.V.:10]  Out: 700 [Urine:700]    Safety Concerns:     [address] [email or web address] Safety Concerns:499481606:::0}    Impairments/Disabilities:      [address] [employer] Impairments/Disabilities:088748151:::0}    Nutrition Therapy:  Current Nutrition Therapy:   [address] [facility] Diet List:936834410:::0}    Routes of Feeding: {[facility] DME Other Feedings:605950470:::0}  Liquids: {Slp liquid thickness:62101}  Daily Fluid Restriction: {CHP DME Yes amt example:660793554:::0}  Last Modified Barium Swallow with Video (Video Swallowing Test): {Done Not Done IAUV:128310830:::0}    Treatments at the Time of Hospital Discharge:   Respiratory Treatments: ***  Oxygen Therapy:  {Therapy; copd oxygen:28295:::0}  Ventilator:    { CC Vent List:004351300:::0}    Rehab Therapies: {THERAPEUTIC INTERVENTION:8401191646}  Weight Bearing Status/Restrictions: [address]  Weight Bearin:::0}  Other Medical Equipment (for information only, NOT a DME order):  {EQUIPMENT:182123309}  Other Treatments: ***    Patient's personal belongings (please select all that are sent with patient):  {CHP DME Belongings:327798763:::0}    RN SIGNATURE:  {Esignature:854380640:::0}    CASE MANAGEMENT/SOCIAL WORK SECTION    Inpatient Status Date: ***    Readmission Risk Assessment Score:  Readmission Risk              Risk of Unplanned Readmission:  10           Discharging to Facility/

## 2021-10-20 NOTE — CARE COORDINATION
Discharge Planning Assessment  RN  discharge planner met with patient, spouse and daughter to discuss reason for admission, current living situation, and potential needs at the time of discharge    Demographics/Insurance verified Yes- [employer] and also [place]    Current type of dwelling: lives in an [address] [place], [place] [address]ment / [place] at [facility]    Patient from ECF/SW confirmed with:  N/A    Living arrangements:  Lives with spouse    Level of function/Support: Independent with ADLs    [facility] [place]      DME:  Uses a walker at times    Active with any community resources/agencies/skilled home care:  No    Medication compliance issues: Mail order arlene Day issues that could impact healthcare:  No        Tentative discharge plan: home with family. Therapy recommending home therapy. Family and patient declines stating that there a wellness center at [facility] where they go for exercises. Discussed and provided facilities of choice if transition to a skilled nursing facility is required at the time of discharge      Discussed with patient and/or family that on the day of discharge home tentative time of discharge will be between 10 AM and noon.     Transportation at the time of discharge:  Daughter

## 2021-10-20 NOTE — PROGRESS NOTES
Physical Therapy    Facility/Department: [address] NURSING  Initial Assessment    NAME: Hayley Holloway  : 1935  MRN: [id number]    Date of Service: 10/20/2021    Discharge Recommendations:      PT Equipment Recommendations  Equipment Needed: No     Hayley Holloway scored a 17/24 on the AM-PAC short mobility form. Current research shows that an AM-PAC score of 18 or greater is typically associated with a discharge to the patient's home setting. Based on the patient's AM-PAC score and their current functional mobility deficits, it is recommended that the patient have 2-3 sessions per week of Physical Therapy at d/c to increase the patient's independence. At this time, this patient demonstrates the endurance and safety to discharge home with HHPT and a follow up treatment frequency of 2-3x/wk. Please see assessment section for further patient specific details. HOME HEALTH CARE: LEVEL 3 SAFETY  - Initial home health evaluation to occur within 24-48 hours, in patient home   - Therapy evaluations in home within 24-48 hours of discharge; including DME and home safety   - Frontload therapy 5 days, then 3x a week   - Therapy to evaluate if patient has [address] needs for personal care   -  evaluation within 24-48 hours, includes evaluation of resources and insurance to determine AL, [place], LTC, and Medicaid options     If patient discharges prior to next session this note will serve as a discharge summary. Please see below for the latest assessment towards goals. Assessment   Body structures, Functions, Activity limitations: Decreased functional mobility ; Decreased balance;Decreased posture;Decreased ROM; Decreased strength;Decreased safe awareness;Decreased endurance  Assessment: Pt presents w/ decreased functional mobility secondary to decreased balance, strength, ROM, posture, safety awareness, and endurance.  Pt PLOF included an ability to ambulate independently while also performing chores around the home. Pt will benefit from skilled PT in order to return to PLOF for increased safety w/ ambulation at home and in the community once d/c from hospital.  Treatment Diagnosis: decreased functional mobility secondary to decreased balance, strength, ROM, posture, safety awareness, and endurance  Prognosis: Good  Decision Making: Medium Complexity  History: Hyperlipidemia, HTN, Diabetes  Exam: Functional mobility and ambulation  Clinical Presentation: Evolving  PT Education: Goals;PT Role;Plan of Care;Transfer Training;General Safety;Gait Training;Functional Mobility Training  Patient Education: Pt was educated on current condition and plan of care for while he is in the hospital and once d/c. Pt verbalized understanding. Barriers to Learning: None  REQUIRES PT FOLLOW UP: Yes  Activity Tolerance  Activity Tolerance: Patient Tolerated treatment well;Patient limited by cognitive status; Patient limited by endurance  Activity Tolerance: Pt appeared to be impulsivess w/ functional mobility secondary to cognitive status       Patient Diagnosis(es): The encounter diagnosis was Multiple subsegmental pulmonary emboli without acute cor pulmonale ([facility] has a past medical history of Diabetes mellitus ([facility] Hyperlipidemia, and Hypertension. has a past surgical history that includes angioplasty. Restrictions  Restrictions/Precautions  Restrictions/Precautions: Fall Risk (High fall risk)  Position Activity Restriction  Other position/activity restrictions: 82 y.o. male with history of hypertension, hyperlipidemia, diabetes mellitus who presented to ED with complaints of back pain. Located in the right posterior chest wall, worse with touch, movement and while standing up. Symptoms started a week ago, also reports pain with urination which started 3 days ago which eventually resolved. Has bilateral leg edema going on for months with recent worsening. Met with pts' wife out side is room in ED.  She reports that he has been very depressed lately after two of his golf friends passed away few months ago, and has been without any activity at home or outside home. Pt apparently is always either sitting in couch or sleeping. In ED, patient is hemodynamically stable. Labs unremarkable except for sodium level of 131. Urinalysis clear. CT abdomen/pelvis suggestive of cholelithiasis and diverticulosis. CT chest showed multiple bilateral pulmonary emboli without signs of right heart strain. Stable respiratory status on room air. Patient was started on heparin drip in ED. Vision/Hearing  Vision: Impaired  Vision Exceptions: Wears glasses for distance  Hearing: Exceptions to [facility]  Hearing Exceptions: Bilateral hearing aid       Subjective  General  Chart Reviewed: Yes  Patient assessed for rehabilitation services?: Yes  Response To Previous Treatment: Not applicable  Family / Caregiver Present: No  Follows Commands: Within Functional Limits  Other (Comment): 82 y.o. male with history of hypertension, hyperlipidemia, diabetes mellitus who presented to ED with complaints of back pain. Located in the right posterior chest wall, worse with touch, movement and while standing up. Symptoms started a week ago, also reports pain with urination which started 3 days ago which eventually resolved. Has bilateral leg edema going on for months with recent worsening. Met with pts' wife out side is room in ED. She reports that he has been very depressed lately after two of his golf friends passed away few months ago, and has been without any activity at home or outside home. Pt apparently is always either sitting in couch or sleeping.   General Comment  Comments: Pt reports he is in no pain upon our arrival  Subjective  Subjective: Pt lying supine w/ HOB elevated  Pain Screening  Patient Currently in Pain: Denies (intermittent back pain, but improved since admission)  Pain Assessment  Pain Assessment: 0-10  Pain Level: 0  Vital Signs  Patient Currently in Pain: Denies       Orientation  Orientation  Overall Orientation Status: Within Functional Limits     Social/Functional History  Social/Functional History  Lives With: Spouse  Type of Home:  ([address] [facility])  Home Layout: One level  Home Access: Level entry  Bathroom Shower/Tub: Tub/Shower unit (Walk in tub)  Bathroom Toilet: Standard  Bathroom Equipment: Grab bars around toilet, Grab bars in shower  Bathroom Accessibility: Accessible  Home Equipment: Rolling walker, Cane, Grab bars  Receives Help From: Family  ADL Assistance: Independent  Homemaking Assistance: Independent  Homemaking Responsibilities: Yes  Ambulation Assistance: Independent  Transfer Assistance: Independent  Active : Yes  Mode of Transportation: Car  Occupation: Retired  Type of occupation: Manufactor agent  Leisure & Hobbies: Sit and read  Additional Comments: Pt reports no falls in past 3-6 months     Cognition   Cognition  Overall Cognitive Status: Exceptions  Arousal/Alertness: Appropriate responses to stimuli  Following Commands:  Follows one step commands consistently  Safety Judgement: Decreased awareness of need for safety;Decreased awareness of need for assistance  Problem Solving: Decreased awareness of errors  Insights: Decreased awareness of deficits  Sequencing: Requires cues for some  Cognition Comment: Pt demonstrated impulsiveness w/ ambulation as noted by not wanting AD and during stand to sit as he tried sitting w/o help    Objective     Observation/Palpation  Posture: Fair (Forward head and rounded shoulders)    AROM RLE (degrees)  RLE AROM: WFL  AROM LLE (degrees)  LLE AROM : WFL  Strength RLE  Strength RLE: WFL  Comment: Grossly 4/5  Strength LLE  Strength LLE: WFL  Comment: Grossly 4/5     Sensation  Overall Sensation Status: WFL  Bed mobility  Supine to Sit: Stand by assistance  Sit to Supine: Stand by assistance  Comment: Pt demonstrated ability to swing legs into and out of bed w/ control SBA  Transfers  Sit to Stand: Stand by assistance (Pt sat up from EOB 1x SBA)  Stand to sit: Contact guard assistance (Pt sat down EOB 1x SBA)  Comment: Pt was impulsive w/ his movements especially w/ stand to sit resulting in pt getting caught up on his way into bed requiring CGA to help  Ambulation  Ambulation?: Yes  More Ambulation?: No  Ambulation 1  Surface: level tile  Device: No Device  Assistance: Contact guard assistance  Gait Deviations: Increased VAIBHAV; Decreased step length;Decreased step height;Shuffles  Distance: 100'  Comments: Pt ambulated from EOB to hallway and back w/ CGA. Pt demonstrated increased tristin which appeared uncontrolled at times. Pt would benefit from rolling walker for increased stability and safety. Stairs/Curb  Stairs?: No  Wheelchair Activities  Wheelchair Parts Management: No  Propulsion: No     Balance  Posture: Fair (Forward head and rounded shoulders)  Sitting - Static: Good (Pt was able to sit EOB w/ no unsteadiness)  Sitting - Dynamic: Good (Pt was able to sit EOB w/ no unsteadiness)  Standing - Static: Fair;+ (Pt appeared unsteady at times due to impulsiveness)  Standing - Dynamic: Fair;+ (Pt appeared unsteady at times due to impulsiveness)        Plan   Plan  Times per week: 3-5  Times per day: Daily  Plan weeks: upon d/c  Current Treatment Recommendations: Strengthening, ROM, Safety Education & Training, Balance Training, Endurance Training, Patient/Caregiver Education & Training, Functional Mobility Training, Transfer Training, Gait Training  Safety Devices  Type of devices:  All fall risk precautions in place, Call light within reach, Gait belt, Patient at risk for falls, Left in bed, Nurse notified, Bed alarm in place  Restraints  Initially in place: No      AM-PAC Score  AM-PAC Inpatient Mobility Raw Score : 17 (10/20/21 1207)  AM-PAC Inpatient T-Scale Score : 42.13 (10/20/21 1207)  Mobility Inpatient CMS 0-100% Score: 50.57 (10/20/21

## 2023-01-25 ENCOUNTER — APPOINTMENT (OUTPATIENT)
Dept: GENERAL RADIOLOGY | Age: 88
DRG: 638 | End: 2023-01-25
Payer: MEDICARE

## 2023-01-25 ENCOUNTER — HOSPITAL ENCOUNTER (INPATIENT)
Age: 88
LOS: 3 days | Discharge: HOME HEALTH | DRG: 638 | End: 2023-01-28
Attending: INTERNAL MEDICINE | Admitting: INTERNAL MEDICINE
Payer: MEDICARE

## 2023-01-25 DIAGNOSIS — R60.0 BILATERAL LOWER EXTREMITY EDEMA: ICD-10-CM

## 2023-01-25 DIAGNOSIS — L03.116 CELLULITIS OF LEFT LOWER EXTREMITY: ICD-10-CM

## 2023-01-25 DIAGNOSIS — L03.115 CELLULITIS OF RIGHT LOWER EXTREMITY: Primary | ICD-10-CM

## 2023-01-25 PROBLEM — E11.42 DIABETIC POLYNEUROPATHY ASSOCIATED WITH TYPE 2 DIABETES MELLITUS (HCC): Status: ACTIVE | Noted: 2023-01-25

## 2023-01-25 PROBLEM — L03.90 CELLULITIS: Status: ACTIVE | Noted: 2023-01-25

## 2023-01-25 LAB
A/G RATIO: 1.1 (ref 1.1–2.2)
ALBUMIN SERPL-MCNC: 3.8 G/DL (ref 3.4–5)
ALP BLD-CCNC: 46 U/L (ref 40–129)
ALT SERPL-CCNC: 23 U/L (ref 10–40)
ANION GAP SERPL CALCULATED.3IONS-SCNC: 13 MMOL/L (ref 3–16)
AST SERPL-CCNC: 19 U/L (ref 15–37)
BASOPHILS ABSOLUTE: 0.1 K/UL (ref 0–0.2)
BASOPHILS RELATIVE PERCENT: 0.7 %
BILIRUB SERPL-MCNC: 0.6 MG/DL (ref 0–1)
BUN BLDV-MCNC: 21 MG/DL (ref 7–20)
C-REACTIVE PROTEIN: <3 MG/L (ref 0–5.1)
CALCIUM SERPL-MCNC: 10 MG/DL (ref 8.3–10.6)
CHLORIDE BLD-SCNC: 102 MMOL/L (ref 99–110)
CO2: 23 MMOL/L (ref 21–32)
CREAT SERPL-MCNC: 0.8 MG/DL (ref 0.8–1.3)
EOSINOPHILS ABSOLUTE: 0.2 K/UL (ref 0–0.6)
EOSINOPHILS RELATIVE PERCENT: 2.5 %
GFR SERPL CREATININE-BSD FRML MDRD: >60 ML/MIN/{1.73_M2}
GLUCOSE BLD-MCNC: 149 MG/DL (ref 70–99)
GLUCOSE BLD-MCNC: 150 MG/DL (ref 70–99)
GLUCOSE BLD-MCNC: 170 MG/DL (ref 70–99)
HCT VFR BLD CALC: 40.2 % (ref 40.5–52.5)
HEMOGLOBIN: 13.3 G/DL (ref 13.5–17.5)
INR BLD: 1.13 (ref 0.87–1.14)
LYMPHOCYTES ABSOLUTE: 1.8 K/UL (ref 1–5.1)
LYMPHOCYTES RELATIVE PERCENT: 20.7 %
MCH RBC QN AUTO: 32.5 PG (ref 26–34)
MCHC RBC AUTO-ENTMCNC: 33.1 G/DL (ref 31–36)
MCV RBC AUTO: 98 FL (ref 80–100)
MONOCYTES ABSOLUTE: 1 K/UL (ref 0–1.3)
MONOCYTES RELATIVE PERCENT: 12 %
NEUTROPHILS ABSOLUTE: 5.6 K/UL (ref 1.7–7.7)
NEUTROPHILS RELATIVE PERCENT: 64.1 %
PDW BLD-RTO: 12.9 % (ref 12.4–15.4)
PERFORMED ON: ABNORMAL
PERFORMED ON: ABNORMAL
PLATELET # BLD: 258 K/UL (ref 135–450)
PMV BLD AUTO: 8 FL (ref 5–10.5)
POTASSIUM REFLEX MAGNESIUM: 3.6 MMOL/L (ref 3.5–5.1)
PRO-BNP: 254 PG/ML (ref 0–449)
PROTHROMBIN TIME: 14.5 SEC (ref 11.7–14.5)
RBC # BLD: 4.1 M/UL (ref 4.2–5.9)
SEDIMENTATION RATE, ERYTHROCYTE: 27 MM/HR (ref 0–20)
SODIUM BLD-SCNC: 138 MMOL/L (ref 136–145)
TOTAL PROTEIN: 7.2 G/DL (ref 6.4–8.2)
WBC # BLD: 8.7 K/UL (ref 4–11)

## 2023-01-25 PROCEDURE — 85652 RBC SED RATE AUTOMATED: CPT

## 2023-01-25 PROCEDURE — 87040 BLOOD CULTURE FOR BACTERIA: CPT

## 2023-01-25 PROCEDURE — 2580000003 HC RX 258: Performed by: PHYSICIAN ASSISTANT

## 2023-01-25 PROCEDURE — 71045 X-RAY EXAM CHEST 1 VIEW: CPT

## 2023-01-25 PROCEDURE — 99223 1ST HOSP IP/OBS HIGH 75: CPT | Performed by: INTERNAL MEDICINE

## 2023-01-25 PROCEDURE — 2580000003 HC RX 258: Performed by: NURSE PRACTITIONER

## 2023-01-25 PROCEDURE — 87205 SMEAR GRAM STAIN: CPT

## 2023-01-25 PROCEDURE — 2500000003 HC RX 250 WO HCPCS: Performed by: NURSE PRACTITIONER

## 2023-01-25 PROCEDURE — 2580000003 HC RX 258: Performed by: INTERNAL MEDICINE

## 2023-01-25 PROCEDURE — 86140 C-REACTIVE PROTEIN: CPT

## 2023-01-25 PROCEDURE — 83880 ASSAY OF NATRIURETIC PEPTIDE: CPT

## 2023-01-25 PROCEDURE — 80053 COMPREHEN METABOLIC PANEL: CPT

## 2023-01-25 PROCEDURE — 6360000002 HC RX W HCPCS: Performed by: PHYSICIAN ASSISTANT

## 2023-01-25 PROCEDURE — 99285 EMERGENCY DEPT VISIT HI MDM: CPT

## 2023-01-25 PROCEDURE — 87070 CULTURE OTHR SPECIMN AEROBIC: CPT

## 2023-01-25 PROCEDURE — 6360000002 HC RX W HCPCS: Performed by: INTERNAL MEDICINE

## 2023-01-25 PROCEDURE — 1200000000 HC SEMI PRIVATE

## 2023-01-25 PROCEDURE — 85610 PROTHROMBIN TIME: CPT

## 2023-01-25 PROCEDURE — 6370000000 HC RX 637 (ALT 250 FOR IP): Performed by: INTERNAL MEDICINE

## 2023-01-25 PROCEDURE — 36415 COLL VENOUS BLD VENIPUNCTURE: CPT

## 2023-01-25 PROCEDURE — 93971 EXTREMITY STUDY: CPT

## 2023-01-25 PROCEDURE — 85025 COMPLETE CBC W/AUTO DIFF WBC: CPT

## 2023-01-25 RX ORDER — LIDOCAINE HYDROCHLORIDE 10 MG/ML
5 INJECTION, SOLUTION EPIDURAL; INFILTRATION; INTRACAUDAL; PERINEURAL ONCE
Status: COMPLETED | OUTPATIENT
Start: 2023-01-25 | End: 2023-01-25

## 2023-01-25 RX ORDER — INSULIN LISPRO 100 [IU]/ML
0-4 INJECTION, SOLUTION INTRAVENOUS; SUBCUTANEOUS NIGHTLY
Status: DISCONTINUED | OUTPATIENT
Start: 2023-01-25 | End: 2023-01-28 | Stop reason: HOSPADM

## 2023-01-25 RX ORDER — DEXTROSE MONOHYDRATE 100 MG/ML
INJECTION, SOLUTION INTRAVENOUS CONTINUOUS PRN
Status: DISCONTINUED | OUTPATIENT
Start: 2023-01-25 | End: 2023-01-28 | Stop reason: HOSPADM

## 2023-01-25 RX ORDER — ATORVASTATIN CALCIUM 80 MG/1
80 TABLET, FILM COATED ORAL DAILY
Status: DISCONTINUED | OUTPATIENT
Start: 2023-01-26 | End: 2023-01-28 | Stop reason: HOSPADM

## 2023-01-25 RX ORDER — ACETAMINOPHEN 325 MG/1
650 TABLET ORAL EVERY 6 HOURS PRN
Status: DISCONTINUED | OUTPATIENT
Start: 2023-01-25 | End: 2023-01-28 | Stop reason: HOSPADM

## 2023-01-25 RX ORDER — VANCOMYCIN HYDROCHLORIDE 1 G/200ML
1000 INJECTION, SOLUTION INTRAVENOUS ONCE
Status: DISCONTINUED | OUTPATIENT
Start: 2023-01-25 | End: 2023-01-25 | Stop reason: SDUPTHER

## 2023-01-25 RX ORDER — ATENOLOL 25 MG/1
25 TABLET ORAL DAILY
Status: DISCONTINUED | OUTPATIENT
Start: 2023-01-26 | End: 2023-01-28 | Stop reason: HOSPADM

## 2023-01-25 RX ORDER — ONDANSETRON 2 MG/ML
4 INJECTION INTRAMUSCULAR; INTRAVENOUS EVERY 6 HOURS PRN
Status: DISCONTINUED | OUTPATIENT
Start: 2023-01-25 | End: 2023-01-28 | Stop reason: HOSPADM

## 2023-01-25 RX ORDER — SODIUM CHLORIDE 0.9 % (FLUSH) 0.9 %
5-40 SYRINGE (ML) INJECTION PRN
Status: DISCONTINUED | OUTPATIENT
Start: 2023-01-25 | End: 2023-01-28 | Stop reason: HOSPADM

## 2023-01-25 RX ORDER — ASPIRIN 81 MG/1
81 TABLET, CHEWABLE ORAL DAILY
Status: DISCONTINUED | OUTPATIENT
Start: 2023-01-26 | End: 2023-01-28 | Stop reason: HOSPADM

## 2023-01-25 RX ORDER — ACETAMINOPHEN 650 MG/1
650 SUPPOSITORY RECTAL EVERY 6 HOURS PRN
Status: DISCONTINUED | OUTPATIENT
Start: 2023-01-25 | End: 2023-01-28 | Stop reason: HOSPADM

## 2023-01-25 RX ORDER — SODIUM CHLORIDE 0.9 % (FLUSH) 0.9 %
5-40 SYRINGE (ML) INJECTION EVERY 12 HOURS SCHEDULED
Status: DISCONTINUED | OUTPATIENT
Start: 2023-01-25 | End: 2023-01-28 | Stop reason: HOSPADM

## 2023-01-25 RX ORDER — SODIUM CHLORIDE 9 MG/ML
INJECTION, SOLUTION INTRAVENOUS PRN
Status: DISCONTINUED | OUTPATIENT
Start: 2023-01-25 | End: 2023-01-28 | Stop reason: HOSPADM

## 2023-01-25 RX ORDER — SODIUM CHLORIDE 9 MG/ML
25 INJECTION, SOLUTION INTRAVENOUS PRN
Status: DISCONTINUED | OUTPATIENT
Start: 2023-01-25 | End: 2023-01-28 | Stop reason: HOSPADM

## 2023-01-25 RX ORDER — ONDANSETRON 4 MG/1
4 TABLET, ORALLY DISINTEGRATING ORAL EVERY 8 HOURS PRN
Status: DISCONTINUED | OUTPATIENT
Start: 2023-01-25 | End: 2023-01-28 | Stop reason: HOSPADM

## 2023-01-25 RX ORDER — FUROSEMIDE 10 MG/ML
40 INJECTION INTRAMUSCULAR; INTRAVENOUS DAILY
Status: DISCONTINUED | OUTPATIENT
Start: 2023-01-25 | End: 2023-01-28 | Stop reason: HOSPADM

## 2023-01-25 RX ORDER — ENOXAPARIN SODIUM 100 MG/ML
30 INJECTION SUBCUTANEOUS 2 TIMES DAILY
Status: DISCONTINUED | OUTPATIENT
Start: 2023-01-25 | End: 2023-01-28 | Stop reason: HOSPADM

## 2023-01-25 RX ORDER — INSULIN LISPRO 100 [IU]/ML
0-8 INJECTION, SOLUTION INTRAVENOUS; SUBCUTANEOUS
Status: DISCONTINUED | OUTPATIENT
Start: 2023-01-25 | End: 2023-01-28 | Stop reason: HOSPADM

## 2023-01-25 RX ORDER — LOSARTAN POTASSIUM 25 MG/1
50 TABLET ORAL DAILY
Status: DISCONTINUED | OUTPATIENT
Start: 2023-01-26 | End: 2023-01-28 | Stop reason: HOSPADM

## 2023-01-25 RX ORDER — POLYETHYLENE GLYCOL 3350 17 G/17G
17 POWDER, FOR SOLUTION ORAL DAILY PRN
Status: DISCONTINUED | OUTPATIENT
Start: 2023-01-25 | End: 2023-01-28 | Stop reason: HOSPADM

## 2023-01-25 RX ADMIN — VANCOMYCIN HYDROCHLORIDE 1250 MG: 10 INJECTION, POWDER, LYOPHILIZED, FOR SOLUTION INTRAVENOUS at 16:55

## 2023-01-25 RX ADMIN — ENOXAPARIN SODIUM 30 MG: 100 INJECTION SUBCUTANEOUS at 21:27

## 2023-01-25 RX ADMIN — ACETAMINOPHEN 650 MG: 325 TABLET ORAL at 21:26

## 2023-01-25 RX ADMIN — SODIUM CHLORIDE, PRESERVATIVE FREE 10 ML: 5 INJECTION INTRAVENOUS at 21:28

## 2023-01-25 RX ADMIN — CEFEPIME 2000 MG: 2 INJECTION, POWDER, FOR SOLUTION INTRAVENOUS at 19:37

## 2023-01-25 RX ADMIN — SODIUM CHLORIDE, PRESERVATIVE FREE 10 ML: 5 INJECTION INTRAVENOUS at 21:29

## 2023-01-25 RX ADMIN — SODIUM CHLORIDE: 9 INJECTION, SOLUTION INTRAVENOUS at 16:49

## 2023-01-25 RX ADMIN — FUROSEMIDE 40 MG: 10 INJECTION, SOLUTION INTRAMUSCULAR; INTRAVENOUS at 16:48

## 2023-01-25 RX ADMIN — LIDOCAINE HYDROCHLORIDE 5 ML: 10 INJECTION, SOLUTION EPIDURAL; INFILTRATION; INTRACAUDAL; PERINEURAL at 21:28

## 2023-01-25 ASSESSMENT — ENCOUNTER SYMPTOMS
RHINORRHEA: 0
ROS SKIN COMMENTS: BILATERAL LEG REDNESS AND SWELLING
COUGH: 0
SINUS PAIN: 0
EYE DISCHARGE: 0
CONSTIPATION: 0
SHORTNESS OF BREATH: 0
WHEEZING: 0
EYE REDNESS: 0
DIARRHEA: 0
NAUSEA: 0
SORE THROAT: 0
ABDOMINAL PAIN: 0
BACK PAIN: 0
SINUS PRESSURE: 0

## 2023-01-25 ASSESSMENT — PAIN - FUNCTIONAL ASSESSMENT: PAIN_FUNCTIONAL_ASSESSMENT: NONE - DENIES PAIN

## 2023-01-25 NOTE — H&P
HOSPITALISTS HISTORY AND PHYSICAL    1/25/2023 2:06 PM    Patient Information:  Nicol Camacho is a 82 y.o. male [id number]  PCP:  E[facility]in [facility] [id number] (Tel: None )    Chief complaint:    Chief Complaint   Patient presents with    Leg Swelling     Presents with swelling. States the swelling is a chronic issue. Now with weeping to right leg x several weeks. Denies pain. History of Present Illness:  Americo Runner is a 82 y.o. male been having 1 week of bilateral lower extremity erythema along with couple weeks of swelling of his feet no nocturnal dyspnea or exertional dyspnea. Patient denies any fevers chills or sick contacts. Patient has a history of DVT in 2021 was treated with Eliquis. Patient had ultrasound of the lower extremity from which were negative. Does not smoke occasionally drinks. REVIEW OF SYSTEMS:   Constitutional: Negative for fever,chills or night sweats  ENT: Negative for rhinorrhea, epistaxis, hoarseness, sore throat. Respiratory: Negative for shortness of breath,wheezing  Cardiovascular: Negative for chest pain, palpitations   Gastrointestinal: Negative for nausea, vomiting, diarrhea  Genitourinary: Negative for polyuria, dysuria   Hematologic/Lymphatic: Negative for bleeding tendency, easy bruising  Musculoskeletal: Negative for myalgias and arthralgias  Neurologic: Negative for confusion,dysarthria. Skin: Negative for itching,rash  Psychiatric: Negative for depression,anxiety, agitation. Endocrine: Negative for polydipsia,polyuria,heat /cold intolerance. Past Medical History:   has a past medical history of Diabetes mellitus ([address] [address] Hyperlipidemia, and Hypertension. Past Surgical History:   has a past surgical history that includes angioplasty. Medications:  No current facility-administered medications on file prior to encounter.      Current Outpatient Medications on File Prior to Encounter   Medication Sig Dispense Refill    apixaban starter pack (ELIQUIS DVT/PE STARTER PACK) 5 MG TBPK tablet Take 1 tablet by mouth See Admin Instructions (Patient not taking: Reported on 1/25/2023) 74 tablet 0    metFORMIN (GLUCOPHAGE) 500 MG tablet Take 500 mg by mouth daily (with breakfast)       atorvastatin (LIPITOR) 80 MG tablet Take 80 mg by mouth daily      atenolol (TENORMIN) 25 MG tablet Take 25 mg by mouth daily      losartan (COZAAR) 50 MG tablet Take 50 mg by mouth daily      aspirin 81 MG chewable tablet Take 81 mg by mouth daily      [DISCONTINUED] vitamin D (CHOLECALCIFEROL) 1000 UNIT TABS tablet Take 1,000 Units by mouth daily         Allergies: Allergies   Allergen Reactions    Lisinopril Cough    Simvastatin      Other reaction(s): Liver Problem  Patient has been tolerating Lipitor for years        Social History:  Patient Lives at home   reports that he has quit smoking. He has never used smokeless tobacco. He reports that he does not drink alcohol and does not use drugs. Family History:  unaware    Physical Exam:  BP (!) 112/49   Pulse 66   Temp 97.7 °F (36.5 °C)   Resp 16   Ht 6' (1.829 m)   Wt 235 lb (106.6 kg)   SpO2 94%   BMI 31.87 kg/m²     General appearance:  Appears comfortable. AAOx3  HEENT: atraumatic, Pupils equal, muscous membranes moist, no masses appreciated  Cardiovascular: Regular rate and rhythm no murmurs appreciated  Respiratory: CTAB no wheezing  Gastrointestinal: Abdomen soft, non-tender, BS+  EXT: BLE  Neurology: no gross focal deficts  Psychiatry: Appropriate affect.  Not agitated  Skin: biLateral lower extremity erythema    Labs:  CBC:   Lab Results   Component Value Date/Time    WBC 8.7 01/25/2023 11:25 AM    RBC 4.10 01/25/2023 11:25 AM    HGB 13.3 01/25/2023 11:25 AM    HCT 40.2 01/25/2023 11:25 AM    MCV 98.0 01/25/2023 11:25 AM    MCH 32.5 01/25/2023 11:25 AM    MCHC 33.1 01/25/2023 11:25 AM    RDW 12.9 01/25/2023 11:25 AM     01/25/2023 11:25 AM    MPV 8.0 01/25/2023 11:25 AM     BMP:    Lab Results   Component Value Date/Time     01/25/2023 11:25 AM    K 3.6 01/25/2023 11:25 AM     01/25/2023 11:25 AM    CO2 23 01/25/2023 11:25 AM    BUN 21 01/25/2023 11:25 AM    CREATININE 0.8 01/25/2023 11:25 AM    CALCIUM 10.0 01/25/2023 11:25 AM    GFRAA >60 10/19/2021 08:23 AM    LABGLOM >60 01/25/2023 11:25 AM    GLUCOSE 150 01/25/2023 11:25 AM     VL Extremity Venous Right         XR CHEST PORTABLE   Final Result   1. No evidence of CHF. 2.  Mild left basilar airspace disease favored to represent atelectasis             Recent imaging reviewed    Problem List  Principal Problem:    Cellulitis  Resolved Problems:    * No resolved hospital problems. *        Assessment/Plan:   Bilateral lower ext cellulitis  - iv atbx  - fu cutlures  - id consult    + BLE: check echo iv lasix check bmp in am    Htn: home meds      DVT prophylaxis lovenox  Code status full code        Admit as inpatient I anticipate hospitalization spanning more than two midnights for investigation and treatment of the above medically necessary diagnoses. Please note that some part of this chart was generated using Dragon dictation software. Although every effort was made to ensure the accuracy of this automated transcription, some errors in transcription may have occurred inadvertently. If you may need any clarification, please do not hesitate to contact me through [facility].        Jessi Olea MD    1/25/2023 2:06 PM

## 2023-01-25 NOTE — PROGRESS NOTES
Patient admitted from ER, VSS. Patient alert and oriented x4. Assisted patient up to bathroom patient voided, assisted patient up in chair with chair alarm on and call light within reach. Patient states he uses a walker at baseline, patient fell a week ago in bedroom and scratched legs, bilateral LE are red with wounds that are weeping., right big toe with dressing. Left arm dressing in place per the ER nurse. Patient denies pain in bilateral LE. POC discussed with patient and agreed upon mutually. Call light within reach, chair alarm on.  Will continue to monitor  Estephanie Dong RN

## 2023-01-25 NOTE — PROGRESS NOTES
Pharmacy Note - Extended Infusion Beta-Lactam Adjustment    Cefepime 2000mg Q12h for treatment of Skin and soft tissue infection. Per [facility] Extended Infusion Beta-Lactam Policy, cefepime will be changed to 2000mg load followed by 2000mg Q12h extended infusion    Estimated Creatinine Clearance: Estimated Creatinine Clearance: 85 mL/min (based on SCr of 0.8 mg/dL). BMI: Body mass index is 31.87 kg/m². Please call with any questions.     Thank you,    Kasandra Donaldson, [facility]

## 2023-01-25 NOTE — PROGRESS NOTES
Patient seen in ED, room 21. Initiated admission and completed through History (occupation history). Patient is alert. Patient lives at home and is being admitted for cellulitis. Home Medication Reconciliation has been verbally reviewed with patient and updated as appropriate. Plan of care updated if indicated. All questions answered.

## 2023-01-25 NOTE — PROGRESS NOTES
Clinical Pharmacy Note: Pharmacy to Dose Vancomycin    Brian Matamoros is a 82 y.o. male started on Vancomycin for cellulitis; consult received from Dr. Pat Jaquez to manage therapy. Also receiving the following antibiotics: cefepime. Goal AUC: 400-600 mg/L*hr  Goal Trough Level: 15-20 mcg/mL    Assessment/Plan:  A 1250 mg loading dose has been ordered. Initiate vancomycin 1000 mg IV every 12 hours. Bayesian modeling predicts an AUC of 483 mg/L*hr and a trough of 16.2 mcg/mL at steady state concentration. A vancomycin random level has been ordered for 1/26 at 1200  Changes in regimen will be determined based on culture results, renal function, and clinical response. Pharmacy will continue to monitor and adjust regimen as necessary. Allergies:  Lisinopril and Simvastatin     Recent Labs     01/25/23  1125   CREATININE 0.8       Recent Labs     01/25/23  1125   WBC 8.7       Ht Readings from Last 1 Encounters:   01/25/23 6' (1.829 m)        Wt Readings from Last 1 Encounters:   01/25/23 235 lb (106.6 kg)         Estimated Creatinine Clearance: 85 mL/min (based on SCr of 0.8 mg/dL).       Thank you for the consult,    Martine Bustamante, PharmD, BCCCP

## 2023-01-25 NOTE — CONSULTS
Infectious Diseases   Consult Note        Admission Date: 1/25/2023  Hospital Day: Hospital Day: 1   Attending: Isis Alston MD  Date of service: 1/25/23     Reason for admission: Cellulitis [L03.90]  Cellulitis of right lower extremity [L03.115]  Cellulitis of left lower extremity [L03.116]  Bilateral lower extremity edema [R60.0]    Chief complaint/ Reason for consult: Bilateral lower extremity cellulitis      Microbiology:        I have reviewed allavailable micro lab data and cultures          Antibiotics and immunizations:       Current antibiotics: All antibiotics and their doses were reviewed by me    Recent Abx Admin                     vancomycin (VANCOCIN) 1,250 mg in sodium chloride 0.9 % 250 mL IVPB (mg) 1,250 mg New Bag 01/25/23 5813                      Immunization History: All immunization history was reviewed by me today. Immunization History   Administered Date(s) Administered    COVID-19, PFIZER PURPLE top, DILUTE for use, (age 15 y+), 30mcg/0.3mL 01/22/2021, 02/12/2021, 10/05/2021       Known drug allergies: All allergies were reviewed and updated    Allergies   Allergen Reactions    Lisinopril Cough    Simvastatin      Reaction unknown       Social history:     Social History:  All social andepidemiologic history was reviewed and updated by me today as needed. Tobacco use:   reports that he quit smoking about 8 years ago. His smoking use included cigarettes. He has never used smokeless tobacco.  Alcohol use:   reports current alcohol use of about 1.0 standard drink per week. Currently lives in: [place] [address]   reports no history of drug use.      COVID VACCINATION AND LAB RESULT RECORDS:     Internal Administration   First Dose COVID-19, PFIZER PURPLE top, DILUTE for use, (age 15 y+), 30mcg/0.3mL  01/22/2021   Second Dose COVID-19, PFIZER PURPLE top, DILUTE for use, (age 15 y+), 30mcg/0.3mL   02/12/2021       Last COVID Lab No results found for: SARS-COV-2, SARS-COV-2 RNA, SARS-COV-2, SARS-COV-2, SARS-COV-2 BY PCR, SARS-COV-2, SARS-COV-2, SARS-COV-2         Assessment:     The patient is a 82 y.o. old male who  has a past medical history of Diabetes mellitus ([address] Hyperlipidemia, and Hypertension. with following problems:    Severe bilateral extremity cellulitis  Right diabetic foot infection with wound involving the right hallux area  Right lower extremity wounds  Longstanding type 2 diabetes mellitus  Diabetic polyneuropathy  Essential hypertension  Mixed hyperlipidemia        Discussion:      The patient had bilateral lower extremity venous Doppler study done earlier today. It was negative for DVTs or superficial venous thrombosis. He has a significant bilateral lower extremity cellulitis. He also has a wound on the right lower leg      Plan:     Diagnostic Workup:    Agree with blood cultures  Will order right leg wound culture  Continue to follow fever curve, WBC count and blood cultures  Follow up on liverand renal functions closely    Antimicrobials:    Agree with starting empiric IV vancomycin for gram-positive coverage  Check Vancomycin trough before the 5th dose. Target vancomycin trough level of 15-20  mg/L or vancomycin area under curve (AUC) of 400-600 mg*h/L by Bayesian modeling method. If dosing vancomycin based on trough levels, keep vancomycin trough below 20 at all times. Avoid increasing the dose of vancomycin above a total of 4 grams in a 24-hour period in patients younger than 45 years and above 3 grams in a 24-hour period in a patient of age 39 years or older. Continue to monitor serum creatinine and Vanco levels closely, while the patient is on I/v Vancomycin.    Recommend IV cefepime 2 g every 12 hour for empiric broad-spectrum gram-negative coverage  Keep your legs elevated while lying down or sitting up  Keep your legs wrapped in Ace wrap  Pain control per primary  Fall precautions  Slow improvement is expected  We will follow up on the culture results and clinical progress and will make further recommendations accordingly. Continue close vitals monitoring. Maintain good glycemic control. Fall precautions. Aspiration precautions. Continue to watch for new fever or diarrhea. DVT prophylaxis. Discussed all above with patient and RN. Discussed all above with patient and his wife at bedside      Drug Monitoring:    Continue serial monitoring for antibiotic toxicity as follows: Vancomycin trough, CBC, CMP  Continue to watch for following: new or worsening fever, hypotension, hives, lip swelling and redness or purulence at vascular access sites. I/v access Management:    Continue to monitor i.v access sites for erythema, induration, discharge or tenderness. As always, continue efforts to minimizetubes/lines/drains as clinically appropriate to reduce chances of line associated infections. Current isolation precautions: There are no current isolations documented for this patient. Level of complexity of visit and medical decision making: High     Risk of Complications/Morbidity: High     Illness(es)/ Infection present that pose threat to life/bodily function. There is potential for severe exacerbation of infection/side effects of treatment. Therapy requires intensive monitoring for antimicrobial agent toxicity. Thank you for involving me in the care of your patient. I will continue to follow. If you have any additional questions, please do not hesitate to contact me. Subjective:     Presenting complaint in ER:     Chief Complaint   Patient presents with    Leg Swelling     Presents with swelling. States the swelling is a chronic issue. Now with weeping to right leg x several weeks. Denies pain. HPI: Manish Biswas is a 82 y.o. male patient, who was seen at the request of Dr. Angelica Almodovar MD.    History was obtained from chart review and the patient. The patient was admitted on 1/25/2023.  I have been consulted to see the patient for above mentioned reason(s). The patient has multiple medical comorbidities, and presented to the ER for for worsening bilateral lower extremity pain and redness and swelling that was going on for about 1 week. The patient is a poor historian and hard of hearing and history was obtained from his wife. The patient had fallen few weeks ago at home according to his wife. He has chronic dementia issues. The patient has chronic bilateral lower extremity swelling but the leg started to become increasingly red and painful over the past week. Her symptoms were gradual in onset, were worsening in nature with no aggravating or relieving factors. The patient also has wounds on the right lower leg. The patient was brought to the ER and has been hospitalized    I have been asked for my opinion for management for this patient. Past Medical History: All past medical history reviewed today. Past Medical History:   Diagnosis Date    Diabetes mellitus ([address]     Type 2    Hyperlipidemia     Hypertension          Past Surgical History: All pastsurgical history was reviewed today. Past Surgical History:   Procedure Laterality Date    ANGIOPLASTY      had 2 angioplasty around age 50 and age 64    APPENDECTOMY      age 44's    CYST REMOVAL      cyst on neck 2022; several on back         Family History: All family history was reviewed today. Problem Relation Age of Onset    Heart Disease Father     Colon Cancer Father     Colon Cancer Sister     Diabetes Maternal Grandfather          Medications: All current and past medications were reviewed.     Medications Prior to Admission: apixaban starter pack (ELIQUIS DVT/PE STARTER PACK) 5 MG TBPK tablet, Take 1 tablet by mouth See Admin Instructions (Patient not taking: Reported on 1/25/2023)  metFORMIN (GLUCOPHAGE) 500 MG tablet, Take 500 mg by mouth daily (with breakfast)   atorvastatin (LIPITOR) 80 MG tablet, Take 80 mg by mouth daily  atenolol (TENORMIN) 25 MG tablet, Take 25 mg by mouth daily  losartan (COZAAR) 50 MG tablet, Take 50 mg by mouth daily  aspirin 81 MG chewable tablet, Take 81 mg by mouth daily     vancomycin  15 mg/kg (Adjusted) IntraVENous Once    [START ON 1/26/2023] aspirin  81 mg Oral Daily    [START ON 1/26/2023] atenolol  25 mg Oral Daily    [START ON 1/26/2023] atorvastatin  80 mg Oral Daily    [START ON 1/26/2023] losartan  50 mg Oral Daily    insulin lispro  0-8 Units SubCUTAneous TID WC    insulin lispro  0-4 Units SubCUTAneous Nightly    furosemide  40 mg IntraVENous Daily    sodium chloride flush  5-40 mL IntraVENous 2 times per day    enoxaparin  30 mg SubCUTAneous BID    cefepime  2,000 mg IntraVENous Once    Followed by    [place] ON 1/26/2023] cefepime  2,000 mg IntraVENous Q12H    [START ON 1/26/2023] vancomycin  1,000 mg IntraVENous Q12H          REVIEW OF SYSTEMS:       Review of Systems   Constitutional:  Negative for chills, diaphoresis and fever. Most of the history obtained from patient's wife   HENT:  Negative for ear discharge, ear pain, postnasal drip, rhinorrhea, sinus pressure, sinus pain and sore throat. Eyes:  Negative for discharge and redness. Respiratory:  Negative for cough, shortness of breath and wheezing. Cardiovascular:  Negative for chest pain and leg swelling. Gastrointestinal:  Negative for abdominal pain, constipation, diarrhea and nausea. Endocrine: Negative for cold intolerance, heat intolerance and polydipsia. Genitourinary:  Negative for dysuria, flank pain, frequency, hematuria and urgency. Musculoskeletal:  Negative for back pain and myalgias. Skin:  Negative for rash. Bilateral leg redness and swelling   Allergic/Immunologic: Negative for immunocompromised state. Neurological:  Negative for dizziness, seizures and headaches. Hematological:  Does not bruise/bleed easily.    Psychiatric/Behavioral:  Negative for agitation, hallucinations and suicidal ideas. The patient is not nervous/anxious. All other systems reviewed and are negative. Objective:       PHYSICAL EXAM:      Vitals:   Vitals:    01/25/23 1050 01/25/23 1520 01/25/23 1545   BP: (!) 112/49 (!) 164/56 132/64   Pulse: 66 59 57   Resp: 16 18 18   Temp: 97.7 °F (36.5 °C)  98.8 °F (37.1 °C)   TempSrc:   Oral   SpO2: 94% 100% 97%   Weight: 235 lb (106.6 kg)     Height: 6' (1.829 m)         Physical Exam  Vitals and nursing note reviewed. Constitutional:       Appearance: He is well-developed. He is not diaphoretic. Comments: The patient was seen earlier today. HENT:      Head: Normocephalic and atraumatic. Right Ear: External ear normal. There is no impacted cerumen. Left Ear: External ear normal. There is no impacted cerumen. Nose: Nose normal.      Mouth/Throat:      Mouth: Mucous membranes are moist.      Pharynx: Oropharynx is clear. No oropharyngeal exudate. Eyes:      General: No scleral icterus. Right eye: No discharge. Left eye: No discharge. Conjunctiva/sclera: Conjunctivae normal.      Pupils: Pupils are equal, round, and reactive to light. Neck:      Thyroid: No thyromegaly. Cardiovascular:      Rate and Rhythm: Normal rate and regular rhythm. Heart sounds: Normal heart sounds. No murmur heard. No friction rub. Pulmonary:      Effort: No respiratory distress. Breath sounds: No stridor. No wheezing or rales. Abdominal:      General: Bowel sounds are normal.      Palpations: Abdomen is soft. Tenderness: There is no abdominal tenderness. There is no guarding or rebound. Musculoskeletal:         General: Swelling and tenderness present. No deformity. Normal range of motion. Cervical back: Normal range of motion and neck supple. Right lower leg: No edema. Left lower leg: No edema. Lymphadenopathy:      Cervical: No cervical adenopathy.    Skin:     General: Skin is warm and dry.      Coloration: Skin is not jaundiced. Findings: Erythema present. No bruising or rash. Comments: Severe bilateral lower extremity cellulitis with wounds on the right lower leg. Right hallux area infection with redness and discharge   Neurological:      General: No focal deficit present. Mental Status: He is alert and oriented to person, place, and time. Mental status is at baseline. Motor: No abnormal muscle tone. Psychiatric:         Mood and Affect: Mood normal.         Behavior: Behavior normal.         Lines and drains: All vascular access sites are healthy with no local erythema, discharge or tenderness. Intake and output:     No intake/output data recorded. Lab Data:   All available labs were reviewed by me today. CBC:   Recent Labs     01/25/23  1125   WBC 8.7   RBC 4.10*   HGB 13.3*   HCT 40.2*      MCV 98.0   MCH 32.5   MCHC 33.1   RDW 12.9        BMP:  Recent Labs     01/25/23  1125      K 3.6      CO2 23   BUN 21*   CREATININE 0.8   CALCIUM 10.0   GLUCOSE 150*        Hepatic FunctionPanel:   Lab Results   Component Value Date/Time    ALKPHOS 46 01/25/2023 11:25 AM    ALT 23 01/25/2023 11:25 AM    AST 19 01/25/2023 11:25 AM    PROT 7.2 01/25/2023 11:25 AM    BILITOT 0.6 01/25/2023 11:25 AM    LABALBU 3.8 01/25/2023 11:25 AM       CPK: No results found for: CKTOTAL  ESR: No results found for: SEDRATE  CRP: No results found for: CRP      Imaging: All pertinent images and reports for the current visit were reviewed by me during this visit. I reviewed the chest x-ray/CT scan/MRI images and independently interpreted the findings and results today. VL Extremity Venous Right   Final Result      XR CHEST PORTABLE   Final Result   1. No evidence of CHF.       2.  Mild left basilar airspace disease favored to represent atelectasis             Outside records:    Labs, Microbiology, Radiology and pertinent results from Care everywhere, if available, were reviewed as a part ofthe consultation. Problem list:       Patient Active Problem List   Diagnosis Code    Pulmonary embolism and infarction ([facility] [[email or web address] I26.99    Essential hypertension I10    DM2 (diabetes mellitus, type 2) ([facility] E11.9    Hyperlipidemia E78.5    Obesity (BMI 30-39. 9) E66.9    Hyponatremia E87.1    Cellulitis L03.90    Diabetic polyneuropathy associated with type 2 diabetes mellitus ([facility] E11.42    Bilateral lower extremity edema R60.0         Please note that this chart was generated using Dragon dictation software. Although every effort was made to ensure the accuracy of this automated transcription, some errors in transcription may have occurred inadvertently. If you may need any clarification, please do not hesitate to contact me through EPIC or at the phone number provided below with my electronic signature. Any pictures or media included in this note were obtained after taking informed verbal consent from the patient and with their approval to include those in the patient's medical record. Zeke Lewis MD, MPH, [address] [place] [address]  1/25/2023, 6:17 PM  [facility] Infectious Disease   [facility] [address] [facility], [address] [place] [address]  Office: [phone]  Fax: [phone]  In-person Clinic days:  Tuesday & Thursday a.m. Virtual clinic days: Monday, Wednesday & Friday a.m.

## 2023-01-25 NOTE — ED PROVIDER NOTES
[facility]        Pt Name: Hayley Holloway  MRN: [id number]  [place] 1935  Date of evaluation: 1/25/2023  Provider: Pollo Scruggs  PCP: [facility]  Note Started: 11:06 AM EST 1/25/23       I have seen and evaluated this patient with my supervising physician No att. providers found. CHIEF COMPLAINT       Chief Complaint   Patient presents with    Leg Swelling     Presents with swelling. States the swelling is a chronic issue. Now with weeping to right leg x several weeks. Denies pain. HISTORY OF PRESENT ILLNESS: 1 or more Elements     History From: patient  Limitations to history : dementia    Hayley Holloway is a 82 y.o. male with past medical history of hypertension, diabetes, PE who presents complaining of right leg swelling. Patient is a poor historian, confused in triage, states his wife knows more and will be with him shortly. Patient complaining of right leg swelling and redness for a few weeks. States he has chronic wounds there, unsure how long they have been there. He called PCP today because his wife was worried, they were directed to the ER to rule out DVT. He has a history of PE 2021, unsure if he is still on Eliquis. Patient was not aware he had a PE in 2021. Denies fever, shortness of breath. Nursing Notes were all reviewed and agreed with or any disagreements were addressed in the HPI. REVIEW OF SYSTEMS :      Review of Systems   Unable to perform ROS: Dementia     Positives and Pertinent negatives as per HPI. PAST MEDICAL HISTORY    has a past medical history of Diabetes mellitus ([address] Hyperlipidemia, and Hypertension.      SURGICAL HISTORY     Past Surgical History:   Procedure Laterality Date    ANGIOPLASTY         CURRENTMEDICATIONS       Previous Medications    APIXABAN STARTER PACK (ELIQUIS DVT/PE STARTER PACK) 5 MG TBPK TABLET    Take 1 tablet by mouth See Admin Instructions    ASPIRIN 81 MG CHEWABLE TABLET    Take 81 mg by mouth daily    ATENOLOL (TENORMIN) 25 MG TABLET    Take 25 mg by mouth daily    ATORVASTATIN (LIPITOR) 80 MG TABLET    Take 80 mg by mouth daily    LOSARTAN (COZAAR) 50 MG TABLET    Take 50 mg by mouth daily    METFORMIN (GLUCOPHAGE) 500 MG TABLET    Take 500 mg by mouth daily (with breakfast)        ALLERGIES     Lisinopril and Simvastatin    FAMILYHISTORY     No family history on file. SOCIAL HISTORY       Social History     Tobacco Use    Smoking status: Former    Smokeless tobacco: Never   Substance Use Topics    Alcohol use: No    Drug use: No       SCREENINGS        [place] Coma Scale  Eye Opening: Spontaneous  Best Verbal Response: Oriented  Best Motor Response: Obeys commands  Kay Coma Scale Score: 15                CIWA Assessment  BP: (!) 112/49  Heart Rate: 66           PHYSICAL EXAM  1 or more Elements     ED Triage Vitals [01/25/23 1050]   BP Temp Temp src Heart Rate Resp SpO2 Height Weight   (!) 112/49 97.7 °F (36.5 °C) -- 66 16 94 % 6' (1.829 m) 235 lb (106.6 kg)       Physical Exam  Vitals and nursing note reviewed. Constitutional:       General: He is not in acute distress. Appearance: He is not ill-appearing or toxic-appearing. HENT:      Head: Normocephalic and atraumatic. Right Ear: External ear normal.      Left Ear: External ear normal.      Nose: Nose normal.      Mouth/Throat:      Mouth: Mucous membranes are moist.      Pharynx: Oropharynx is clear. Eyes:      Conjunctiva/sclera: Conjunctivae normal.   Cardiovascular:      Rate and Rhythm: Normal rate and regular rhythm. Pulses: Normal pulses. Heart sounds: Normal heart sounds. Pulmonary:      Effort: Pulmonary effort is normal. No respiratory distress. Breath sounds: Normal breath sounds. Abdominal:      General: Abdomen is flat. Bowel sounds are normal. There is no distension. Palpations: Abdomen is soft. Tenderness:  There is no abdominal tenderness. There is no guarding or rebound. Musculoskeletal:         General: Swelling present. Normal range of motion. Cervical back: Normal range of motion and neck supple. Right lower leg: Edema present. Left lower leg: Edema present. Comments: BLE 2+ pitting edema. Distal bilateral lower extremity circumferential erythema and warmth, cellulitis appearance. Right anterior shin with 2 superficial wounds, each approximately 8 cm in diameter with weeping, no purulence. Skin:     General: Skin is warm and dry. Capillary Refill: Capillary refill takes less than 2 seconds. Neurological:      Mental Status: He is alert and oriented to person, place, and time. Motor: Weakness present. Psychiatric:         Mood and Affect: Mood normal.         Behavior: Behavior normal.           DIAGNOSTIC RESULTS   LABS:    Labs Reviewed   CBC WITH AUTO DIFFERENTIAL - Abnormal; Notable for the following components:       Result Value    RBC 4.10 (*)     Hemoglobin 13.3 (*)     Hematocrit 40.2 (*)     All other components within normal limits   COMPREHENSIVE METABOLIC PANEL W/ REFLEX TO MG FOR LOW K - Abnormal; Notable for the following components:    Glucose 150 (*)     BUN 21 (*)     All other components within normal limits   CULTURE, BLOOD 2   CULTURE, BLOOD 1   PROTIME-INR   BRAIN NATRIURETIC PEPTIDE       When ordered only abnormal lab results are displayed. All other labs were within normal range or not returned as of this dictation. EKG: When ordered, EKG's are interpreted by the Emergency Department Physician in the absence of a cardiologist.  Please see their note for interpretation of EKG.     RADIOLOGY:   Non-plain film images such as CT, Ultrasound and MRI are read by the radiologist. Plain radiographic images are visualized and preliminarily interpreted by the ED Provider with the below findings:        Interpretation per the Radiologist below, if available at the time of this note:    VL Extremity Venous Right         XR CHEST PORTABLE   Final Result   1. No evidence of CHF. 2.  Mild left basilar airspace disease favored to represent atelectasis           No results found. No results found. PROCEDURES   Unless otherwise noted below, none     Procedures    CRITICAL CARE TIME (.cctime)         EMERGENCY DEPARTMENT COURSE and DIFFERENTIAL DIAGNOSIS/MDM:   Vitals:    Vitals:    01/25/23 1050   BP: (!) 112/49   Pulse: 66   Resp: 16   Temp: 97.7 °F (36.5 °C)   SpO2: 94%   Weight: 235 lb (106.6 kg)   Height: 6' (1.829 m)       Patient was given the following medications:  Medications   vancomycin (VANCOCIN) 1,250 mg in sodium chloride 0.9 % 250 mL IVPB (has no administration in time range)             Is this patient to be included in the SEP-1 Core Measure due to severe sepsis or septic shock? No   Exclusion criteria - the patient is NOT to be included for SEP-1 Core Measure due to:  2+ SIRS criteria are not met    Chronic Conditions affecting care:    has a past medical history of Diabetes mellitus ([facility] Hyperlipidemia, and Hypertension. CONSULTS: (Who and What was discussed)  IP CONSULT TO HOSPITALIST          Records Reviewed (Source):     10/19/21 DC summary for admission for pulmonary embolism. Epic.    CC/HPI Summary, DDx, ED Course, and Reassessment:     Ayesha Hendrix is a 82 y.o. male with past medical history of hypertension, diabetes, PE who presents complaining of right leg swelling. Patient is a poor historian, confused in triage, states his wife knows more and will be with him shortly. Patient complaining of right leg swelling and redness for a few weeks. States he has chronic wounds there, unsure how long they have been there. He called PCP today because his wife was worried, they were directed to the ER to rule out DVT. He has a history of PE 2021, unsure if he is still on Eliquis. Patient was not aware he had a PE in 2021.   Denies fever, shortness of breath. Right lower venous ultrasound negative for DVT. CBC, CMP, BNP, INR nonemergent/unremarkable. Patient has extensive bilateral cellulitis with edema and weeping, recommended admission. Shared medical decision making-family and patient agreed to admission when offered. Will admit to medicine via perfect serve messaging system per hospital protocol. No shock, signs of severe sepsis. Disposition Considerations (tests considered but not done, Admit vs D/C, Shared Decision Making, Pt Expectation of Test or Tx.):        I am the Primary Clinician of Record. FINAL IMPRESSION      1. Cellulitis of right lower extremity    2. Cellulitis of left lower extremity    3. Bilateral lower extremity edema          DISPOSITION/PLAN     DISPOSITION Decision To Admit 01/25/2023 01:19:05 PM      PATIENT REFERRED TO:  No follow-up provider specified. DISCHARGE MEDICATIONS:  New Prescriptions    No medications on file       DISCONTINUED MEDICATIONS:  Discontinued Medications    No medications on file              (Please note that portions of this note were completed with a voice recognition program.  Efforts were made to edit the dictations but occasionally words are mis-transcribed. )    Josefina Winter PA-C (electronically signed)            Josefina Winter PA-C  01/25/23 2221

## 2023-01-25 NOTE — PROGRESS NOTES
Infectious disease consult received. The patient was seen and examined at bedside. Orders placed in the chart. Get blood cultures, right leg wound culture, ESR and CRP    Start iv vanco and cefepime    Full ID consult note to follow. Thank you for involving me in the care of your patient. I will continue to follow. Carroll Heller MD, MPH, FACP, FIDSA  1/25/2023, 4:51 PM  [facility] Infectious Disease   [facility] [facility] [address]  Office: [phone]  Fax: [phone]  In-person Clinic days:  Tuesday & Thursday a.m. Virtual clinic days: Monday, Wednesday & Friday a.m.

## 2023-01-26 LAB
ANION GAP SERPL CALCULATED.3IONS-SCNC: 10 MMOL/L (ref 3–16)
BASOPHILS ABSOLUTE: 0.1 K/UL (ref 0–0.2)
BASOPHILS RELATIVE PERCENT: 1.4 %
BUN BLDV-MCNC: 17 MG/DL (ref 7–20)
CALCIUM SERPL-MCNC: 9.5 MG/DL (ref 8.3–10.6)
CHLORIDE BLD-SCNC: 100 MMOL/L (ref 99–110)
CO2: 28 MMOL/L (ref 21–32)
CREAT SERPL-MCNC: 0.8 MG/DL (ref 0.8–1.3)
EOSINOPHILS ABSOLUTE: 0.3 K/UL (ref 0–0.6)
EOSINOPHILS RELATIVE PERCENT: 2.8 %
GFR SERPL CREATININE-BSD FRML MDRD: >60 ML/MIN/{1.73_M2}
GLUCOSE BLD-MCNC: 126 MG/DL (ref 70–99)
GLUCOSE BLD-MCNC: 127 MG/DL (ref 70–99)
GLUCOSE BLD-MCNC: 148 MG/DL (ref 70–99)
GLUCOSE BLD-MCNC: 234 MG/DL (ref 70–99)
GLUCOSE BLD-MCNC: 263 MG/DL (ref 70–99)
HCT VFR BLD CALC: 37 % (ref 40.5–52.5)
HEMOGLOBIN: 12.7 G/DL (ref 13.5–17.5)
LV EF: 58 %
LVEF MODALITY: NORMAL
LYMPHOCYTES ABSOLUTE: 2.3 K/UL (ref 1–5.1)
LYMPHOCYTES RELATIVE PERCENT: 25.6 %
MCH RBC QN AUTO: 33.5 PG (ref 26–34)
MCHC RBC AUTO-ENTMCNC: 34.3 G/DL (ref 31–36)
MCV RBC AUTO: 97.6 FL (ref 80–100)
MONOCYTES ABSOLUTE: 1.2 K/UL (ref 0–1.3)
MONOCYTES RELATIVE PERCENT: 13.4 %
NEUTROPHILS ABSOLUTE: 5.1 K/UL (ref 1.7–7.7)
NEUTROPHILS RELATIVE PERCENT: 56.8 %
PDW BLD-RTO: 13.2 % (ref 12.4–15.4)
PERFORMED ON: ABNORMAL
PLATELET # BLD: 237 K/UL (ref 135–450)
PMV BLD AUTO: 8.5 FL (ref 5–10.5)
POTASSIUM REFLEX MAGNESIUM: 3.8 MMOL/L (ref 3.5–5.1)
RBC # BLD: 3.79 M/UL (ref 4.2–5.9)
SODIUM BLD-SCNC: 138 MMOL/L (ref 136–145)
VANCOMYCIN RANDOM: 9 UG/ML
WBC # BLD: 9.1 K/UL (ref 4–11)

## 2023-01-26 PROCEDURE — 1200000000 HC SEMI PRIVATE

## 2023-01-26 PROCEDURE — 97530 THERAPEUTIC ACTIVITIES: CPT

## 2023-01-26 PROCEDURE — 02HV33Z INSERTION OF INFUSION DEVICE INTO SUPERIOR VENA CAVA, PERCUTANEOUS APPROACH: ICD-10-PCS | Performed by: INTERNAL MEDICINE

## 2023-01-26 PROCEDURE — 36415 COLL VENOUS BLD VENIPUNCTURE: CPT

## 2023-01-26 PROCEDURE — 6370000000 HC RX 637 (ALT 250 FOR IP): Performed by: INTERNAL MEDICINE

## 2023-01-26 PROCEDURE — 85025 COMPLETE CBC W/AUTO DIFF WBC: CPT

## 2023-01-26 PROCEDURE — 97535 SELF CARE MNGMENT TRAINING: CPT

## 2023-01-26 PROCEDURE — 2580000003 HC RX 258: Performed by: NURSE PRACTITIONER

## 2023-01-26 PROCEDURE — 99233 SBSQ HOSP IP/OBS HIGH 50: CPT | Performed by: INTERNAL MEDICINE

## 2023-01-26 PROCEDURE — 6360000002 HC RX W HCPCS: Performed by: INTERNAL MEDICINE

## 2023-01-26 PROCEDURE — 6360000004 HC RX CONTRAST MEDICATION: Performed by: INTERNAL MEDICINE

## 2023-01-26 PROCEDURE — 80048 BASIC METABOLIC PNL TOTAL CA: CPT

## 2023-01-26 PROCEDURE — C1751 CATH, INF, PER/CENT/MIDLINE: HCPCS

## 2023-01-26 PROCEDURE — 80202 ASSAY OF VANCOMYCIN: CPT

## 2023-01-26 PROCEDURE — 2580000003 HC RX 258: Performed by: INTERNAL MEDICINE

## 2023-01-26 PROCEDURE — C8929 TTE W OR WO FOL WCON,DOPPLER: HCPCS

## 2023-01-26 PROCEDURE — 97166 OT EVAL MOD COMPLEX 45 MIN: CPT

## 2023-01-26 PROCEDURE — 97116 GAIT TRAINING THERAPY: CPT

## 2023-01-26 PROCEDURE — 36569 INSJ PICC 5 YR+ W/O IMAGING: CPT

## 2023-01-26 PROCEDURE — 97161 PT EVAL LOW COMPLEX 20 MIN: CPT

## 2023-01-26 RX ADMIN — INSULIN LISPRO 4 UNITS: 100 INJECTION, SOLUTION INTRAVENOUS; SUBCUTANEOUS at 17:12

## 2023-01-26 RX ADMIN — INSULIN LISPRO 2 UNITS: 100 INJECTION, SOLUTION INTRAVENOUS; SUBCUTANEOUS at 13:49

## 2023-01-26 RX ADMIN — CEFEPIME 2000 MG: 2 INJECTION, POWDER, FOR SOLUTION INTRAVENOUS at 17:07

## 2023-01-26 RX ADMIN — CEFEPIME 2000 MG: 2 INJECTION, POWDER, FOR SOLUTION INTRAVENOUS at 04:16

## 2023-01-26 RX ADMIN — ENOXAPARIN SODIUM 30 MG: 100 INJECTION SUBCUTANEOUS at 09:33

## 2023-01-26 RX ADMIN — SODIUM CHLORIDE, PRESERVATIVE FREE 10 ML: 5 INJECTION INTRAVENOUS at 20:54

## 2023-01-26 RX ADMIN — VANCOMYCIN HYDROCHLORIDE 1000 MG: 1 INJECTION, POWDER, LYOPHILIZED, FOR SOLUTION INTRAVENOUS at 05:32

## 2023-01-26 RX ADMIN — PERFLUTREN 1.5 ML: 6.52 INJECTION, SUSPENSION INTRAVENOUS at 13:25

## 2023-01-26 RX ADMIN — ASPIRIN 81 MG: 81 TABLET, CHEWABLE ORAL at 09:34

## 2023-01-26 RX ADMIN — ATORVASTATIN CALCIUM 80 MG: 80 TABLET, FILM COATED ORAL at 09:34

## 2023-01-26 RX ADMIN — ENOXAPARIN SODIUM 30 MG: 100 INJECTION SUBCUTANEOUS at 20:55

## 2023-01-26 RX ADMIN — VANCOMYCIN HYDROCHLORIDE 1000 MG: 1 INJECTION, POWDER, LYOPHILIZED, FOR SOLUTION INTRAVENOUS at 21:01

## 2023-01-26 ASSESSMENT — ENCOUNTER SYMPTOMS
EYE REDNESS: 0
EYE DISCHARGE: 0
SHORTNESS OF BREATH: 0
CONSTIPATION: 0
NAUSEA: 0
DIARRHEA: 0
WHEEZING: 0
BACK PAIN: 0
ABDOMINAL PAIN: 0
SINUS PRESSURE: 0
SORE THROAT: 0
RHINORRHEA: 0
SINUS PAIN: 0
COUGH: 0

## 2023-01-26 NOTE — CARE COORDINATION
[facility] Nurse  Consult Note       NAME:  Fish Cristina  MEDICAL RECORD NUMBER:  [id number]  AGE: 87 y.o.   GENDER: male  : 1935  TODAY'S DATE:  2023    Subjective   Reason for WOCN Evaluation and Assessment: wounds to lower right leg, here for cellulitis      Fish Cristina is a 87 y.o. male referred by:   [x] Physician  [x] Nursing  [] Other:     Wound Identification:  Wound Type: venous  Contributing Factors: edema    Wound History: present on admission  Current Wound Care Treatment:  foam dressings    Patient Goal of Care:  [x] Wound Healing  [] Odor Control  [] Palliative Care  [] Pain Control   [] Other:         PAST MEDICAL HISTORY        Diagnosis Date    Diabetes mellitus (HCC)     Type 2    Hyperlipidemia     Hypertension        PAST SURGICAL HISTORY    Past Surgical History:   Procedure Laterality Date    ANGIOPLASTY      had 2 angioplasty around age 52 and age 59    APPENDECTOMY      age 42's    CYST REMOVAL      cyst on neck ; several on back       FAMILY HISTORY    Family History   Problem Relation Age of Onset    Heart Disease Father     Colon Cancer Father     Colon Cancer Sister     Diabetes Maternal Grandfather        SOCIAL HISTORY    Social History     Tobacco Use    Smoking status: Former     Types: Cigarettes     Quit date:      Years since quittin.0    Smokeless tobacco: Never   Vaping Use    Vaping Use: Never used   Substance Use Topics    Alcohol use: Yes     Alcohol/week: 1.0 standard drink     Types: 1 Cans of beer per week     Comment: with dinner will have 1 beer    Drug use: No       ALLERGIES    Allergies   Allergen Reactions    Lisinopril Cough    Simvastatin      Reaction unknown       MEDICATIONS    No current facility-administered medications on file prior to encounter.     Current Outpatient Medications on File Prior to Encounter   Medication Sig Dispense Refill    apixaban starter pack (ELIQUIS DVT/PE STARTER PACK) 5 MG TBPK  tablet Take 1 tablet by mouth See Admin Instructions (Patient not taking: Reported on 1/25/2023) 74 tablet 0    metFORMIN (GLUCOPHAGE) 500 MG tablet Take 500 mg by mouth daily (with breakfast)       atorvastatin (LIPITOR) 80 MG tablet Take 80 mg by mouth daily      atenolol (TENORMIN) 25 MG tablet Take 25 mg by mouth daily      losartan (COZAAR) 50 MG tablet Take 50 mg by mouth daily      aspirin 81 MG chewable tablet Take 81 mg by mouth daily      [DISCONTINUED] vitamin D (CHOLECALCIFEROL) 1000 UNIT TABS tablet Take 1,000 Units by mouth daily         Objective    /64   Pulse 58   Temp 97.8 °F (36.6 °C) (Oral)   Resp 18   Ht 6' (1.829 m)   Wt 235 lb (106.6 kg)   SpO2 98%   BMI 31.87 kg/m²     LABS:  WBC:    Lab Results   Component Value Date/Time    WBC 9.1 01/26/2023 05:09 AM     H/H:    Lab Results   Component Value Date/Time    HGB 12.7 01/26/2023 05:09 AM    HCT 37.0 01/26/2023 05:09 AM     PTT:    Lab Results   Component Value Date/Time    APTT 76.5 10/20/2021 12:57 PM   [APTT}  PT/INR:    Lab Results   Component Value Date/Time    PROTIME 14.5 01/25/2023 11:25 AM    INR 1.13 01/25/2023 11:25 AM     HgBA1c:    Lab Results   Component Value Date/Time    LABA1C 6.9 10/19/2021 08:23 AM       Assessment   Sandoval Risk Score: Sandoval Scale Score: 19    Patient Active Problem List   Diagnosis Code    Pulmonary embolism and infarction (HCC) I26.99    Essential hypertension I10    DM2 (diabetes mellitus, type 2) (HCC) E11.9    Hyperlipidemia E78.5    Obesity (BMI 30-39. 9) E66.9    Hyponatremia E87.1    Cellulitis L03.90    Diabetic polyneuropathy associated with type 2 diabetes mellitus ([facility] E11.42    Bilateral lower extremity edema R60.0       Measurements:  Wound 01/26/23 Pretibial Right Cellulitis (Active)   Wound Etiology Other 01/25/23 2104   Dressing Status New dressing applied 01/25/23 2104   Wound Cleansed Not Cleansed 01/25/23 2104   Dressing/Treatment Foam 01/25/23 2104   Wound Assessment Pink/red 01/26/23 0915   Drainage Amount Small 01/26/23 0915   Drainage Description Clear 01/26/23 0915   Odor None 01/26/23 0915   Kacie-wound Assessment Blanchable erythema 01/26/23 0915   Margins Defined edges 01/26/23 0915   Number of days: 0      Patient seen for open areas to lower right leg. Patient agreeable to visit. Patient's bilateral legs are darrell looking and edematous. There are two shallow wounds to right lower leg. Wound edges looked frayed. Wounds are weeping clear fluid. Patient denies having swelling before. Patient not sure if he has chf.  He does say he has had swelling before. Patient is on iv antibiotics, recommend hydorfiber with silver to wounds and cover with foam dressings. Will place orders      Response to treatment:  Well tolerated by patient. Pain Assessment:  Severity:  0 / 10  Quality of pain: N/A  Wound Pain Timing/Severity: none  Premedicated: No    Plan   Plan of Care: Wound 01/26/23 Pretibial Right Cellulitis-Dressing/Treatment: Foam Cleanse legs with saline, apply saline moistened hydrofiber with silver to wounds, cut hydrofiber to size of wound. Cover with foam dressing. Change every 2 days. Top wound to right lower leg    Bottom wound to right lower leg    Specialty Bed Required : No   [] Low Air Loss   [] Pressure Redistribution  [] Fluid Immersion  [] Bariatric  [] Total Pressure Relief  [] Other:     Current Diet: ADULT DIET;  Regular; Low Fat/Low Chol/High Fiber/2 gm Na  Dietician consult:  Yes    Discharge Plan:  Placement for patient upon discharge: home with support    Patient appropriate for Outpatient [facility] [place] Road: Yes    Referrals:  [x]   [x] [facility]  [] Supplies  [] Other    Patient/Caregiver Teaching:  Level of patient/caregiver understanding able to:   [x] Indicates understanding       [x] Needs reinforcement  [] Unsuccessful      [] Verbal Understanding  [] Demonstrated understanding       [] No evidence of learning  [] Refused teaching         [] N/A       Electronically signed by  CHILO JeanN, RN, [address] [place] [address]  [phone] on 1/26/2023 at 3:10 PM

## 2023-01-26 NOTE — PLAN OF CARE
Problem: Discharge Planning  Goal: Discharge to home or other facility with appropriate resources  Outcome: Progressing  Flowsheets (Taken 1/25/2023 1613 by Amber Gibson RN)  Discharge to home or other facility with appropriate resources: Identify barriers to discharge with patient and caregiver     Problem: Safety - Adult  Goal: Free from fall injury  Outcome: Progressing

## 2023-01-26 NOTE — PROGRESS NOTES
Infectious Diseases   Progress Note      Admission Date: 1/25/2023  Hospital Day: Hospital Day: 2   Attending: Doug Burrell MD  Date of service: 1/26/2023     Chief complaint/ Reason for consult:     Severe bilateral extremity cellulitis  Right diabetic foot infection with wound involving the right hallux area  Right lower extremity wounds  Longstanding type 2 diabetes mellitus    Microbiology:        I have reviewed allavailable micro lab data and cultures    Blood culture (2/2) - collected on 1/25/2023: Negative      Antibiotics and immunizations:       Current antibiotics: All antibiotics and their doses were reviewed by me    Recent Abx Admin                     vancomycin 1000 mg IVPB in 250 mL NS addavial (mg) 1,000 mg New Bag 01/26/23 0532    cefepime (MAXIPIME) 2,000 mg in sodium chloride 0.9 % 50 mL IVPB (mini-bag) (mg) 2,000 mg New Bag 01/26/23 0416    cefepime (MAXIPIME) 2,000 mg in sodium chloride 0.9 % 50 mL IVPB (mini-bag) (mg) 2,000 mg New Bag 01/1935    vancomycin (VANCOCIN) 1,250 mg in sodium chloride 0.9 % 250 mL IVPB (mg) 1,250 mg New Bag 01/25/23 1655                      Immunization History: All immunization history was reviewed by me today. Immunization History   Administered Date(s) Administered    COVID-19, PFIZER PURPLE top, DILUTE for use, (age 15 y+), 30mcg/0.3mL 01/22/2021, 02/12/2021, 10/05/2021       Known drug allergies: All allergies were reviewed and updated    Allergies   Allergen Reactions    Lisinopril Cough    Simvastatin      Reaction unknown       Social history:     Social History:  All social andepidemiologic history was reviewed and updated by me today as needed. Tobacco use:   reports that he quit smoking about 8 years ago. His smoking use included cigarettes. He has never used smokeless tobacco.  Alcohol use:   reports current alcohol use of about 1.0 standard drink per week.   Currently lives in: [address] [facility]   reports no history of drug use.     COVID VACCINATION AND LAB RESULT RECORDS:     Internal Administration   First Dose COVID-19, PFIZER PURPLE top, DILUTE for use, (age 15 y+), 30mcg/0.3mL  01/22/2021   Second Dose COVID-19, PFIZER PURPLE top, DILUTE for use, (age 15 y+), 30mcg/0.3mL   02/12/2021       Last COVID Lab No results found for: SARS-COV-2, SARS-COV-2 RNA, SARS-COV-2, SARS-COV-2, SARS-COV-2 BY PCR, SARS-COV-2, SARS-COV-2, SARS-COV-2         Assessment:     The patient is a 82 y.o. old male who  has a past medical history of Diabetes mellitus ([address] Hyperlipidemia, and Hypertension. with following problems:    Severe bilateral extremity cellulitis-covered with vancomycin and cefepime  Right diabetic foot infection with wound involving the right hallux area-remains on broad-spectrum antibiotics  Right lower extremity wounds  Longstanding type 2 diabetes mellitus-maintain good glycemic  Diabetic polyneuropathy  Essential hypertension  Mixed hyperlipidemia      Discussion:      The patient is afebrile. He is on IV vancomycin and IV cefepime. 2 sets of blood cultures from yesterday are in process. Right leg wound culture is in process. White cell count is 9100. Serum creatinine 0.8 today. CRP less than 3. Liver functions are okay. Sed rate was 27. Plan:     Diagnostic Workup:    Follow-up on right leg wound culture  Continue to follow  fever curve, WBC count and blood cultures. Continue to monitor blood counts, liver and renal function. Antimicrobials: Will continue empiric IV vancomycin  Check Vancomycin trough before the 5th dose. Target vancomycin trough level of 15-20  mg/L or vancomycin area under curve (AUC) of 400-600 mg*h/L by Bayesian modeling method. If dosing vancomycin based on trough levels, keep vancomycin trough below 20 at all times.   Avoid increasing the dose of vancomycin above a total of 4 grams in a 24-hour period in patients younger than 45 years and above 3 grams in a 24-hour period in a patient of age 39 years or older. Continue to monitor serum creatinine and Vanco levels closely, while the patient is on I/v Vancomycin. Continue IV cefepime 2 g every 12 hours for empiric gram-negative coverage  Will order oral probiotic twice daily  Keep both legs elevated while lying down or sitting up  Pain control per primary  Slow improvement is expected  We will follow up on the culture results and clinical progress and will make further recommendations accordingly. Continue close vitals monitoring. Maintain good glycemic control. Fall precautions. Aspiration precautions. Continue to watch for new fever or diarrhea. DVT prophylaxis. Discussed all above with patient and RN. Drug Monitoring:    Continue monitoring for antibiotic toxicity as follows: CBC, CMP, vancomycin trough  Continue to watch for following: new or worsening fever, new hypotension, hives, lip swelling and redness or purulence at vascular access sites. I/v access Management:    Continue to monitor i.v access sites for erythema, induration, discharge or tenderness. As always, continue efforts to minimize tubes/lines/drains as clinically appropriate to reduce chances of line associated infections. Patient education and counseling: The patient was educated in detail about the side-effects of various antibiotics and things to watch for like new rashes, lip swelling, severe reaction, worsening diarrhea, break through fever etc.  Discussed patient's condition and what to expect. All of the patient's questions were addressed in a satisfactory manner and patient verbalized understanding all instructions. Level of complexity of visit and medical decision making: High     Risk of Complications/Morbidity: High     Illness(es)/ Infection present that pose threat to bodily function. There is potential for severe exacerbation of infection/side effects of treatment.   Therapy requires intensive monitoring for antimicrobial agent toxicity. TIME SPENT TODAY:     - Spent over  37 minutes on visit (including interval history, physical exam, review of data including labs, cultures, imaging, development and implementation of treatment plan and coordination of complex care). More than 50 percent of this includes face-to-face time spent with the patient for counseling and coordination of care. Thank you for involving me in the care of your patient. I will continue to follow. If you have anyadditional questions, please do not hesitate to contact me. Subjective: Interval history: Interval history was obtained from chart review and patient/ RN. The patient is afebrile. He is tolerating antibiotics okay. No diarrhea     REVIEW OF SYSTEMS:     Review of Systems   Constitutional:  Negative for chills, diaphoresis and fever. HENT:  Negative for ear discharge, ear pain, postnasal drip, rhinorrhea, sinus pressure, sinus pain and sore throat. Eyes:  Negative for discharge and redness. Respiratory:  Negative for cough, shortness of breath and wheezing. Cardiovascular:  Negative for chest pain and leg swelling. Gastrointestinal:  Negative for abdominal pain, constipation, diarrhea and nausea. Endocrine: Negative for cold intolerance, heat intolerance and polydipsia. Genitourinary:  Negative for dysuria, flank pain, frequency, hematuria and urgency. Musculoskeletal:  Negative for back pain and myalgias. Ongoing bilateral leg redness and swelling   Skin:  Negative for rash. Allergic/Immunologic: Negative for immunocompromised state. Neurological:  Negative for dizziness, seizures and headaches. Hematological:  Does not bruise/bleed easily. Psychiatric/Behavioral:  Negative for agitation, hallucinations and suicidal ideas. The patient is not nervous/anxious. All other systems reviewed and are negative. Past Medical History: All past medical history reviewed today.     Past Medical History:   Diagnosis Date    Diabetes mellitus ([facility]     Type 2    Hyperlipidemia     Hypertension        Past Surgical History: All past surgical history was reviewed today. Past Surgical History:   Procedure Laterality Date    ANGIOPLASTY      had 2 angioplasty around age 50 and age 64    APPENDECTOMY      age 44's    CYST REMOVAL      cyst on neck 2022; several on back       Family History: All family history was reviewed today. Problem Relation Age of Onset    Heart Disease Father     Colon Cancer Father     Colon Cancer Sister     Diabetes Maternal Grandfather        Objective:       PHYSICAL EXAM:      Vitals:   Vitals:    01/25/23 2330 01/26/23 0419 01/26/23 0915 01/26/23 1345   BP: 129/77 129/60 (!) 91/55 126/64   Pulse: 63 58 63 58   Resp: 18 18 18 18   Temp: 97.8 °F (36.6 °C) 97.6 °F (36.4 °C) 97.8 °F (36.6 °C)    TempSrc: Oral Oral Oral    SpO2: 96% 97% 95% 98%   Weight:       Height:           Physical Exam  Vitals and nursing note reviewed. Constitutional:       Appearance: He is well-developed. He is not diaphoretic. Comments: The patient was seen earlier today. HENT:      Head: Normocephalic and atraumatic. Right Ear: External ear normal. There is no impacted cerumen. Left Ear: External ear normal. There is no impacted cerumen. Nose: Nose normal.      Mouth/Throat:      Mouth: Mucous membranes are moist.      Pharynx: Oropharynx is clear. No oropharyngeal exudate. Eyes:      General: No scleral icterus. Right eye: No discharge. Left eye: No discharge. Conjunctiva/sclera: Conjunctivae normal.      Pupils: Pupils are equal, round, and reactive to light. Neck:      Thyroid: No thyromegaly. Cardiovascular:      Rate and Rhythm: Normal rate and regular rhythm. Heart sounds: Normal heart sounds. No murmur heard. No friction rub. Pulmonary:      Effort: No respiratory distress. Breath sounds: No stridor. No wheezing or rales. Abdominal:      General: Bowel sounds are normal.      Palpations: Abdomen is soft. Tenderness: There is no abdominal tenderness. There is no guarding or rebound. Musculoskeletal:         General: No swelling, tenderness or deformity. Normal range of motion. Cervical back: Normal range of motion and neck supple. Right lower leg: Edema present. Left lower leg: Edema present. Comments: B/l leg cellulitis   Lymphadenopathy:      Cervical: No cervical adenopathy. Skin:     General: Skin is warm and dry. Coloration: Skin is not jaundiced. Findings: No bruising, erythema or rash. Neurological:      General: No focal deficit present. Mental Status: He is alert and oriented to person, place, and time. Mental status is at baseline. Motor: No abnormal muscle tone. Psychiatric:         Mood and Affect: Mood normal.         Behavior: Behavior normal.          Lines and drains: All vascular access sites are healthy with no local erythema, discharge or tenderness. Intake and output:    I/O last 3 completed shifts: In: 240 [P.O.:240]  Out: 800 [Urine:800]    Lab Data:   All available labs and old records have been reviewed by me.     CBC:  Recent Labs     01/25/23  1125 01/26/23  0509   WBC 8.7 9.1   RBC 4.10* 3.79*   HGB 13.3* 12.7*   HCT 40.2* 37.0*    237   MCV 98.0 97.6   MCH 32.5 33.5   MCHC 33.1 34.3   RDW 12.9 13.2        BMP:  Recent Labs     01/25/23  1125 01/26/23  0509    138   K 3.6 3.8    100   CO2 23 28   BUN 21* 17   CREATININE 0.8 0.8   CALCIUM 10.0 9.5   GLUCOSE 150* 126*        Hepatic Function Panel:   Lab Results   Component Value Date/Time    ALKPHOS 46 01/25/2023 11:25 AM    ALT 23 01/25/2023 11:25 AM    AST 19 01/25/2023 11:25 AM    PROT 7.2 01/25/2023 11:25 AM    BILITOT 0.6 01/25/2023 11:25 AM    LABALBU 3.8 01/25/2023 11:25 AM       CPK: No results found for: CKTOTAL  ESR:   Lab Results   Component Value Date    SEDRATE 27 (H) 01/25/2023     CRP:   Lab Results   Component Value Date    CRP <3.0 01/25/2023           Imaging: All pertinent images and reports for the current visit were reviewed by me during this visit. I reviewed the chest x-ray/CT scan/MRI images and independently interpreted the findings and results today. VL Extremity Venous Right   Final Result      XR CHEST PORTABLE   Final Result   1. No evidence of CHF. 2.  Mild left basilar airspace disease favored to represent atelectasis             Medications: All current and past medications were reviewed. vancomycin  1,000 mg IntraVENous Q12H    aspirin  81 mg Oral Daily    atenolol  25 mg Oral Daily    atorvastatin  80 mg Oral Daily    losartan  50 mg Oral Daily    insulin lispro  0-8 Units SubCUTAneous TID WC    insulin lispro  0-4 Units SubCUTAneous Nightly    furosemide  40 mg IntraVENous Daily    sodium chloride flush  5-40 mL IntraVENous 2 times per day    enoxaparin  30 mg SubCUTAneous BID    cefepime  2,000 mg IntraVENous Q12H    sodium chloride flush  5-40 mL IntraVENous 2 times per day        sodium chloride 5 mL/hr at 01/25/23 1649    dextrose      sodium chloride         sodium chloride flush, sodium chloride, ondansetron **OR** ondansetron, polyethylene glycol, acetaminophen **OR** acetaminophen, glucose, dextrose bolus **OR** dextrose bolus, glucagon (rDNA), dextrose, sodium chloride flush, sodium chloride      Problem list:       Patient Active Problem List   Diagnosis Code    Pulmonary embolism and infarction ([facility] I26.99    Essential hypertension I10    DM2 (diabetes mellitus, type 2) (HCC) E11.9    Hyperlipidemia E78.5    Obesity (BMI 30-39. 9) E66.9    Hyponatremia E87.1    Cellulitis L03.90    Diabetic polyneuropathy associated with type 2 diabetes mellitus ([facility] E11.42    Bilateral lower extremity edema R60.0       Please note that this chart was generated using Dragon dictation software.  Although every effort was made to ensure the accuracy of this automated transcription, some errors in transcription may have occurred inadvertently. If you may need any clarification, please do not hesitate to contact me through EPIC or at the phone number provided below with my electronic signature. Any pictures or media included in this note were obtained after taking informed verbal consent from the patient and with their approval to include those in the patient's medical record. Vivek Macias MD, MPH, FACP, FID  1/26/2023, 3:10 PM  [facility] Infectious Disease   [address] [place] [address], [address] [place] [address]  Office: [phone]  Fax: [phone]  In-person Clinic days:  Tuesday & Thursday a.m. Virtual clinic days: Monday, Wednesday & Friday a.m.

## 2023-01-26 NOTE — PROGRESS NOTES
Night shift assessment completed. Routine vitals have been obtained. Scheduled medications given. Patient is awake, alert and oriented/ talking to staff. Respirations are easy and unlabored with no c/o SOB. Skin has been assessed per writer. IV site is C/D/I. Patient does not appear to be in distress. Call light within reach. Standard safety measures are in place. All needs have been met at this time.

## 2023-01-26 NOTE — PROGRESS NOTES
[facility],6[address] Msb Department   Phone: [phone]    Occupational Therapy    [x] Initial Evaluation            [] Daily Treatment Note         [] Discharge Summary      Patient: Colton Ramos   : 1935   MRN: [id number]   Date of Service:  2023    Admitting Diagnosis:  Cellulitis  Current Admission Summary:    Leg Swelling       Presents with swelling. States the swelling is a chronic issue. Now with weeping to right leg x several weeks. Denies pain. HISTORY OF PRESENT ILLNESS: 1 or more Elements      History From: patient  Limitations to history : dementia     Colton Ramos is a 82 y.o. male with past medical history of hypertension, diabetes, PE who presents complaining of right leg swelling. Patient is a poor historian, confused in triage, states his wife knows more and will be with him shortly. Patient complaining of right leg swelling and redness for a few weeks. States he has chronic wounds there, unsure how long they have been there. He called PCP today because his wife was worried, they were directed to the ER to rule out DVT. He has a history of PE , unsure if he is still on Eliquis. Patient was not aware he had a PE in . Denies fever, shortness of breath. Patient admitted from ER, VSS. Patient alert and oriented x4. Assisted patient up to bathroom patient voided, assisted patient up in chair with chair alarm on and call light within reach. Patient states he uses a walker at baseline, patient fell a week ago in bedroom and scratched legs, bilateral LE are red with wounds that are weeping., right big toe with dressing. Left arm dressing in place per the ER nurse. Patient denies pain in bilateral LE. Past Medical History:  has a past medical history of Diabetes mellitus ([address] Hyperlipidemia, and Hypertension. Past Surgical History:  has a past surgical history that includes angioplasty;  Appendectomy; and cyst removal.    Discharge Recommendations: Jimmie Mukherjee scored a 17/24 on the AM-PAC ADL Inpatient form. Current research shows that an AM-PAC score of 18 or greater is typically associated with a discharge to the patient's home setting. Based on the patient's AM-PAC score, and their current ADL deficits, it is recommended that the patient have 2-3 sessions per week of Occupational Therapy at d/c to increase the patient's independence. At this time, this patient demonstrates the endurance and safety to discharge home with home OT2 (home vs OP services) and a follow up treatment frequency of 2-3x/wk. Please see assessment section for further patient specific details. If patient discharges prior to next session this note will serve as a discharge summary. Please see below for the latest assessment towards goals.         HOME HEALTH CARE: LEVEL 3 SAFETY     - Initial home health evaluation to occur within 24-48 hours, in patient home   - Therapy evaluations in home within 24-48 hours of discharge; including DME and home safety   - Frontload therapy 5 days, then 3x a week   - Therapy to evaluate if patient has [address] needs for personal care   -  evaluation within 24-48 hours, includes evaluation of resources and insurance to determine [place], [place], LTC, and Medicaid options    DME Required For Discharge: patient has all required DME for discharge    Precautions/Restrictions: high fall risk, up as tolerated  Weight Bearing Restrictions: no restrictions  [] Right Upper Extremity  [] Left Upper Extremity [] Right Lower Extremity  [] Left Lower Extremity     Required Braces/Orthotics: no braces required   [] Right  [] Left  Positional Restrictions:no positional restrictions    Pre-admission Information  Social/Functional History  Lives With: Spouse  Type of Home:  ([address] [place] [address])  Home Layout: One level  Home Access: Level entry  Bathroom Shower/Tub: walk-in shower with a built in seat with grab bars   Bathroom Toilet: Standard-- states is too low   Bathroom Equipment: Grab bars around toilet, Grab bars in shower  Bathroom Accessibility: Accessible  Home Equipment: Rolling walker, 4 prong Cane, Grab bars, rollator, Uses a RW in the apartment. Uses the rollator outside. Sleeps in a recliner. Receives Help From: Family  ADL Assistance: Independent  Homemaking Assistance: Independent-- has a cleaning lady once a week, shares cooking   Homemaking Responsibilities: Yes  Ambulation Assistance: Independent with RW  Transfer Assistance: Independent with RW  Active : Yes  Mode of Transportation: Car  Occupation: Retired  Type of occupation: Manufactor agent  Leisure & Hobbies: Sit and read  Additional Comments: 1 fall which resulted in this admission    _____________________________________________________________________________________  Objective   Vision: Impaired  Vision Exceptions: Wears glasses for distance and reading. Hearing: Exceptions to [facility]  Hearing Exceptions: Bilateral hearing aid      Perception:   WFL  Observation:   General Observation:  red LE from cellulitis, ace wrap on left forearm from fall  Posture:   Good posture  Sensation:   denies numbness and tingling  Proprioception:    WFL  Tone:   Normotonic  Coordination Testing:   Coordination and Movement Description: (R) UE, (L) UE, fine motor impairments, resting tremors, head/neck tremors.  Stated has been like this for years but able to eat soup and handwriting is OK.     ROM:   (B) UE AROM WFL  Strength:   (B) UE strength grossly WFL    Therapist Clinical Decision Making (Complexity): medium complexity  Clinical Presentation: stable      Subjective  General: Patient supine in bed, poor historian, contradicted timeline multiple times-- increased time to ask prior level   Pain: 0/10, stated no pain but then stated low back is sore  Pain Interventions: repositioned         Activities of Daily Living  Basic Activities of Daily Living  Grooming: setup assistance  Grooming Comments: wash face and brush teeth standing at sink   Lower Extremity Dressing: moderate assistance maximum assistance  Dressing Comments: assist to don hospital pants, dep right sock, able to doff left sock but unable to don right sock   Toileting: minimal assistance. Toileting Comments: min assist hospital pants  General Comments: declined bathing and dressing. Instrumental Activities of Daily Living  No IADL completed on this date. Functional Mobility  Bed Mobility  Supine to Sit: minimal assistance  Comments:  Transfers  Sit to stand transfer:stand by assistance, contact guard assistance  Stand to sit transfer: stand by assistance, contact guard assistance  Toilet transfer: contact guard assistance  Toilet transfer equipment: standard bedside commode  Toilet transfer comments: BSC over commode   Comments:  Functional Mobility:  Functional Mobility: .  stand by assistance  Functional Mobility Activity: to/from bathroom  Functional Mobility Device Use: rolling walker  Functional Mobility Comment: SBA with 1 episode of CGA while ambulating. See PT for details, ambulated @ 180 feet.  Stood @ 4-5 minutes x 2 (ambulation and then standing at sink to brush teeth)    Other Therapeutic Interventions    Functional Outcomes  AM-PAC Inpatient Daily Activity Raw Score: 17    Cognition  Overall Cognitive Status: Impaired  Arousal/Alterness: appropriate responses to stimuli  Following Commands: follows one step commands with increased time, follows one step commands consistently  Attention Span: appears intact  Memory: decreased recall of recent events, decreased short term memory  Safety Judgement: decreased awareness of need for assistance  Problem Solving: assistance required to generate solutions  Insights: decreased awareness of deficits  Initiation: does not require cues  Sequencing: requires cues for some  Comments: ER note dx of dementia, very hard of hearing    Orientation:    alert and oriented x 4  Command Following:   accurately follows one step commands     Education  Barriers To Learning: cognition and hearing   Patient Education: patient educated on goals, OT role and benefits, plan of care, ADL adaptive strategies, transfer training, discharge recommendations  Learning Assessment:  patient verbalizes understanding, would benefit from continued reinforcement    Assessment  Activity Tolerance: Patient appears to be close to baseline for ADLs and mobility. Poor historian and contradicted self multiple time regarding timeline. Impairments Requiring Therapeutic Intervention: decreased functional mobility, decreased ADL status, decreased cognition, decreased endurance  Prognosis: good and fair  Clinical Assessment: Patient is below baseline level following admission to hospital with fall a week ago with now infection/cellulitis. Patient lives at [address] [place] [address] with wife. Recommend home therapy. Patient would benefit from further OT at this time.    Safety Interventions: patient left in chair, chair alarm in place, call light within reach, patient at risk for falls, and nurse notified    Plan  Frequency: 3-5 x/per week  Current Treatment Recommendations: ROM, balance training, functional mobility training, transfer training, ADL/self-care training, and safety education    Goals  Patient Goals: Return home with wife   Short Term Goals:  Time Frame: until discharge  Patient will complete lower body ADL at minimal assistance   Patient will complete toileting at set up assistance   Patient will complete functional transfers at set up assistance   Patient will increase [facility] ADL score = to or > than 21/24    Therapy Session Time     Individual Group Co-treatment   Time In    0821   Time Out    0906   Minutes    45        Timed Code Treatment Minutes:  30   Total Treatment Minutes:  45       Electronically Signed By: JIMBO Martinez/MIKE [address]

## 2023-01-26 NOTE — PROGRESS NOTES
Clinical Pharmacy Note: Pharmacy to Dose Vancomycin    Vancomycin Day: 1  Indication: Cellulitis  Current Dose: 100 mg IV twice daily  Dosing Method: Bayesian Modeling    Random: 9.0    Recent Labs     01/25/23  1125 01/26/23  0509   BUN 21* 17     Recent Labs     01/25/23  1125 01/26/23  0509   CREATININE 0.8 0.8     Recent Labs     01/25/23  1125 01/26/23  0509   WBC 8.7 9.1     Intake/Output Summary (Last 24 hours) at 1/26/2023 1421  Last data filed at 1/25/2023 2138  Gross per 24 hour   Intake 240 ml   Output 800 ml   Net -560 ml     Ht Readings from Last 1 Encounters:   01/25/23 6' (1.829 m)        Wt Readings from Last 1 Encounters:   01/25/23 235 lb (106.6 kg)     Body mass index is 31.87 kg/m². Estimated Creatinine Clearance: 85 mL/min (based on SCr of 0.8 mg/dL). Assessment/Plan:  Vancomycin level is therapeutic. Continue current vancomycin regimen of 1000 mg every 12 hours. Bayesian Modeling predicts an AUC of 474 mg/L*hr and trough of 16.7 mg/L. A vancomycin random level has been ordered on 1/29 at 0600 for follow-up. Changes in regimen will be determined based on culture results, renal function, and clinical response. Pharmacy will continue to monitor and adjust regimen as necessary.     Thank you for the consult,    Enrrique Narvaez, PharmD  PGY-1 Pharmacy Resident  [id number]

## 2023-01-26 NOTE — PROGRESS NOTES
[facility]    1/26/2023 1:33 PM        Name: Tina Guo .               Admitted: 1/25/2023  Primary Care Provider: [facility] (Tel: None)        Subjective:    Lying in bed no fever or chills or chest pain    Reviewed interval ancillary notes    Current Medications  aspirin chewable tablet 81 mg, Daily  atenolol (TENORMIN) tablet 25 mg, Daily  atorvastatin (LIPITOR) tablet 80 mg, Daily  losartan (COZAAR) tablet 50 mg, Daily  insulin lispro (HUMALOG) injection vial 0-8 Units, TID WC  insulin lispro (HUMALOG) injection vial 0-4 Units, Nightly  furosemide (LASIX) injection 40 mg, Daily  sodium chloride flush 0.9 % injection 5-40 mL, 2 times per day  sodium chloride flush 0.9 % injection 5-40 mL, PRN  0.9 % sodium chloride infusion, PRN  enoxaparin Sodium (LOVENOX) injection 30 mg, BID  ondansetron (ZOFRAN-ODT) disintegrating tablet 4 mg, Q8H PRN   Or  ondansetron (ZOFRAN) injection 4 mg, Q6H PRN  polyethylene glycol (GLYCOLAX) packet 17 g, Daily PRN  acetaminophen (TYLENOL) tablet 650 mg, Q6H PRN   Or  acetaminophen (TYLENOL) suppository 650 mg, Q6H PRN  cefepime (MAXIPIME) 2,000 mg in sodium chloride 0.9 % 50 mL IVPB (mini-bag), Q12H  vancomycin 1000 mg IVPB in 250 mL NS addavial, Q12H  glucose-vitamin C chewable tablet 4 tablet, PRN  dextrose bolus 10% 125 mL, PRN   Or  dextrose bolus 10% 250 mL, PRN  glucagon (rDNA) injection 1 mg, PRN  dextrose 10 % infusion, Continuous PRN  sodium chloride flush 0.9 % injection 5-40 mL, 2 times per day  sodium chloride flush 0.9 % injection 5-40 mL, PRN  0.9 % sodium chloride infusion, PRN        Objective:  BP (!) 91/55   Pulse 63   Temp 97.8 °F (36.6 °C) (Oral)   Resp 18   Ht 6' (1.829 m)   Wt 235 lb (106.6 kg)   SpO2 95%   BMI 31.87 kg/m²     Intake/Output Summary (Last 24 hours) at 1/26/2023 1333  Last data filed at 1/25/2023 2138  Gross per 24 hour Intake 240 ml   Output 800 ml   Net -560 ml      Wt Readings from Last 3 Encounters:   01/25/23 235 lb (106.6 kg)   10/20/21 249 lb 8 oz (113.2 kg)   06/25/18 240 lb (108.9 kg)       General appearance:  Appears comfortable. AAOx3  HEENT: atraumatic, Pupils equal, muscous membranes moist, no masses appreciated  Cardiovascular: Regular rate and rhythm no murmurs appreciated  Respiratory: CTAB no wheezing  Gastrointestinal: Abdomen soft, non-tender, BS+  EXT: BLE  Neurology: no gross focal deficts  Psychiatry: Appropriate affect. Not agitated  Skin: biLateral lower extremity erythema    Labs and Tests:  CBC:   Recent Labs     01/25/23  1125 01/26/23  0509   WBC 8.7 9.1   HGB 13.3* 12.7*    237     BMP:    Recent Labs     01/25/23  1125 01/26/23  0509    138   K 3.6 3.8    100   CO2 23 28   BUN 21* 17   CREATININE 0.8 0.8   GLUCOSE 150* 126*     Hepatic:   Recent Labs     01/25/23  1125   AST 19   ALT 23   BILITOT 0.6   ALKPHOS 46     VL Extremity Venous Right   Final Result      XR CHEST PORTABLE   Final Result   1. No evidence of CHF. 2.  Mild left basilar airspace disease favored to represent atelectasis             Recent imaging reviewed    Problem List  Principal Problem:    Cellulitis  Active Problems:    Diabetic polyneuropathy associated with type 2 diabetes mellitus (HCC)    Bilateral lower extremity edema    Essential hypertension  Resolved Problems:    * No resolved hospital problems.  *       Assessment/Plan:   Bilateral lower ext cellulitis  - iv atbx  - fu cutlures  - id consult on board     + BLE:echo santhosh lfu, iv lasix 40mg bmp in am     Htn: home meds        DVT prophylaxis lovenox  Code status full code      Hipolito Chavez MD   1/26/2023 1:33 PM

## 2023-01-26 NOTE — PROGRESS NOTES
Arrived to place midine in patient with joselyn RN at bedside, pre procedure and allergies reviewed, no issues accessing basilic vein, pt tolerated well, blood return and flushed well,  OK to use midline. Pt left in stable condition and bed braked and in lowest position. Pt call light within reach.  Handoff to RN

## 2023-01-26 NOTE — CARE COORDINATION
Discharge Planning Assessment  Risk of Readmission Score: 8%    RN discharge planner met with patient, spouse, and daughter, Latanya Barker, to discuss reason for admission, current living situation, and potential needs at the time of discharge. Demographics/Insurance verified Yes    Current type of dwelling: Independent living [place] [facility]    Patient from ECF/SW confirmed with: N/A    Living arrangements: Spouse    Level of function/Support: Patient uses a cane, walker, rollator to ambulate in the home, patient requires some assistance ADL's    PCP: [address]    Last Visit to PCP: N/A    DME: cane, walker, rollator, walk-in shower, shower seat, grab bar    Active with any community resources/agencies/skilled home care:     - [address], provides the patient with needed PT services. Medication compliance issues: The patient states he is compliant with his medications.     Financial issues that could impact healthcare: none    Tentative discharge plan: PT/OT recommends Rajiv , discharge plan TBD, if SNF is needed place referral with Gavi Krishnamurthy      Transportation at the time of discharge: TBD    CHILO VillalobosN RN    [facility]  Phone: [phone]

## 2023-01-26 NOTE — PROGRESS NOTES
[facility] Department   Phone: [phone]    Physical Therapy    [x] Initial Evaluation            [] Daily Treatment Note         [] Discharge Summary      Patient: Jayson Esquivel   : 1935   MRN: [id number]   Date of Service:  2023  Admitting Diagnosis: Cellulitis  Current Admission Summary: Per ED note on  \" 82 y.o. male with past medical history of hypertension, diabetes, PE who presents complaining of right leg swelling. Patient is a poor historian, confused in triage, states his wife knows more and will be with him shortly. Patient complaining of right leg swelling and redness for a few weeks. States he has chronic wounds there, unsure how long they have been there. He called PCP today because his wife was worried, they were directed to the ER to rule out DVT. He has a history of PE , unsure if he is still on Eliquis. Patient was not aware he had a PE in . Denies fever, shortness of breath. Right lower venous ultrasound negative for DVT. CBC, CMP, BNP, INR nonemergent/unremarkable. Patient has extensive bilateral cellulitis with edema and weeping, recommended admission. Shared medical decision making-family and patient agreed to admission when offered. Will admit to medicine via perfect serve messaging system per hospital protocol. \"  Past Medical History:  has a past medical history of Diabetes mellitus ([address] Hyperlipidemia, and Hypertension. Past Surgical History:  has a past surgical history that includes angioplasty; Appendectomy; and cyst removal.    Discharge Recommendations: Jayson Esquivel scored a 16/24 on the AM-PAC short mobility form. Current research shows that an AM-PAC score of 18 or greater is typically associated with a discharge to the patient's home setting.  Based on the patient's AM-PAC score and their current functional mobility deficits, it is recommended that the patient have 2-3 sessions per week of Physical Therapy at d/c to increase the patient's independence. At this time, this patient demonstrates the endurance and safety to discharge home with 24/7 assist and home PT and a follow up treatment frequency of 2-3x/wk. Please see assessment section for further patient specific details. HOME HEALTH CARE: LEVEL 3 SAFETY  - Initial home health evaluation to occur within 24-48 hours, in patient home   - Therapy evaluations in home within 24-48 hours of discharge; including DME and home safety   - Frontload therapy 5 days, then 3x a week   - Therapy to evaluate if patient has [address] needs for personal care   -  evaluation within 24-48 hours, includes evaluation of resources and insurance to determine [place], [place], LTC, and Medicaid options     If patient discharges prior to next session this note will serve as a discharge summary. Please see below for the latest assessment towards goals.       DME Required For Discharge: no DME required at discharge    Precautions/Restrictions: high fall risk  Weight Bearing Restrictions: no restrictions  [] Right Upper Extremity  [] Left Upper Extremity [] Right Lower Extremity  [] Left Lower Extremity     Required Braces/Orthotics: no braces required   [] Right  [] Left  Positional Restrictions:no positional restrictions    Pre-Admission Information   Lives With: spouse    Type of Home:  independent living at [facility]: one level  Home Access: level entry  Wings Intellect: walk in shower  Bathroom Equipment: grab bars in shower, grab bars around toilet, built in shower seat  Toilet Height: standard height - states this is too low  Home Equipment: rolling walker, rollator - 4 wheeled walker, quad cane  Transfer Assistance: Independent without use of device  Ambulation Assistance:modified independent with use of RW in home, Rollator in community  ADL Assistance: independent with all ADL's - but requires intermittent assist with socks  IADL Assistance: independent with homemaking tasks - shares cooking with spouse, cleaning lady 1x/week  Active :        [x] Yes  [] No  Hand Dominance: [] Left  [x] Right  Current Employment: retired. Occupation: manufacture agent  Hobbies: reading  [address]: 1 fall prior to this admission    Comment: Pt sleeps in recliner. Had home PT/OT through [place] a year ago. Workout on the [employer] on Tuesdays. Examination   Vision:   Vision Gross Assessment: Impaired and Vision Corrective Device: wears glasses for distance and reading  Hearing:   hard of hearing - states it is more helpful for people to talk slow vs loud  Observation:   General Observation:  Redness in (B) lower legs, bandages over anterior (R) tibia, bandages over (L) UE, (R) great toe wound  Posture:   Fair - forward head, rounded shoulders  Sensation:   WFL  ROM:   (B) Hip AROM WFL  (L) Knee: lacking ~10 deg extension     (R) Knee: lacking ~15 deg extension  (B) Ankle AROM WFL  Strength:   (L) Hip flex: +4        (R) Hip flex: -4 (limited by pain s/p fall)  (L) Knee flex/ext: +4     (R) Knee flex/ext: +4  (L) Ankle DF: +4     (R) Ankle DF: +4  Therapist Clinical Decision Making (Complexity): low complexity  Clinical Presentation: stable      Subjective  General: Patient semi-reclined in bed upon therapist arrival. Agreeable to PT/OT eval.  Pain: 0/10 - pt did report mild hip pain in (R) hip with MMTs  Pain Interventions: not applicable       Functional Mobility  Bed Mobility  Supine to Sit: minimal assistance  Rolling Right: stand by assistance  Scooting: stand by assistance  Comments: Increased time for bed mobility with use of rail, bed flat. Of note pt sleeps in a recliner at baseline.    Transfers  Sit to stand transfer: contact guard assistance  Stand to sit transfer: contact guard assistance  Toilet transfer: contact guard assistance, BSC over toilet  Comments: Pt pulls on RA for sit>stand, education and cues provided for safer hand placement  Ambulation  Surface:level surface  Assistive Device: rolling walker  Assistance: stand by assistance, contact guard assistance  Distance: 130 ft + 50 ft + 10 ft  Gait Mechanics: Decreased knee flexion on (R), decreased step length (B), forward flexed posture, slow tristin  Comments:  Pt primarily SBA for ambulation with RW but requires CGA for one episode of (R) knee buckling. PT demonstrated good use of UE support to help him regain balance. Stair Mobility  Stair mobility not completed on this date. Comments:  Wheelchair Mobility:  No w/c mobility completed on this date. Comments:  Balance  Static Sitting Balance: good: independent with functional balance in unsupported position  Dynamic Sitting Balance: fair (+): maintains balance at SBA/supervision without use of UE support  Static Standing Balance: fair (-): maintains balance at SBA with use of UE support  Dynamic Standing Balance: fair (-): maintains balance at SBA with use of UE support  Comments: Pt sat EOB to don/doff socks with SBA. Pt stood x4 minutes at sink with unilateral UE support a majority of the time, SBA. Other Therapeutic Interventions  See OT note for assist with dressing and toileting.     Functional Outcomes  AM-PAC Inpatient Mobility Raw Score : 16              Cognition  Overall Cognitive Status: Impaired  Arousal/Alterness: delayed responses to stimuli  Following Commands: follows one step commands with repetition, follows one step commands with increased time  Memory: decreased recall of recent events  Safety Judgement: decreased awareness of need for assistance  Insights: decreased awareness of deficits  Sequencing: requires cues for some  Orientation:    alert and oriented x 4  Command Following:   accurately follows one step commands with increased time and repetition    Education  Barriers To Learning: cognition and hearing  Patient Education: patient educated on goals, PT role and benefits, plan of care, general safety, functional mobility training, proper use of assistive device/equipment, transfer training, discharge recommendations  Learning Assessment:  patient verbalizes understanding, would benefit from continued reinforcement    Assessment  Activity Tolerance: Pt tolerated activity with no adverse symptoms or reactions  Impairments Requiring Therapeutic Intervention: decreased functional mobility, decreased ROM, decreased strength, decreased safety awareness, decreased cognition, decreased endurance, decreased balance, decreased posture  Prognosis: good  Clinical Assessment: Patient is an 83 yo male admitted to [address] [place] [address] s/p fall at home last week found to have multiple wounds and (B) LE cellulitis. The patient is functioning mildly below his baseline but is able to ambulate household and short community distances with use of RW. The patient had one episode of (R) knee buckling but is able to recover this balance without physical assist from the therapist. The patient is safe to discharge home with home therapy to safely progress independence and balance with functional mobility.    Safety Interventions: chair alarm in place, call light within reach, gait belt, patient at risk for falls, and nurse notified    Plan  Frequency: 3-5 x/per week  Current Treatment Recommendations: strengthening, balance training, functional mobility training, transfer training, gait training, endurance training, patient/caregiver education, home exercise program, safety education, equipment evaluation/education, and positioning    Goals  Patient Goals: Go home   Short Term Goals:  Time Frame: Before discharge  Patient will complete bed mobility at modified independent, with [facility] elevated   Patient will complete transfers at [facility]   Patient will ambulate 180 ft with use of rolling walker at modified independent  Patient to maintain standing at modified independent for 10 minutes with unilateral UE support in order to complete ADLs    Therapy Session Time      Individual Group Co-treatment   Time In     5936   Time Out     0906   Minutes     45     Timed Code Treatment Minutes:  30 Minutes  Total Treatment Minutes:  45 minutes       Electronically Signed By: СЕРГЕЙ Torres PT, DPT #[id number]

## 2023-01-27 LAB
ANION GAP SERPL CALCULATED.3IONS-SCNC: 12 MMOL/L (ref 3–16)
BASOPHILS ABSOLUTE: 0.2 K/UL (ref 0–0.2)
BASOPHILS RELATIVE PERCENT: 1.8 %
BUN BLDV-MCNC: 16 MG/DL (ref 7–20)
CALCIUM SERPL-MCNC: 9.6 MG/DL (ref 8.3–10.6)
CHLORIDE BLD-SCNC: 101 MMOL/L (ref 99–110)
CO2: 26 MMOL/L (ref 21–32)
CREAT SERPL-MCNC: 0.7 MG/DL (ref 0.8–1.3)
EOSINOPHILS ABSOLUTE: 0.2 K/UL (ref 0–0.6)
EOSINOPHILS RELATIVE PERCENT: 2.5 %
GFR SERPL CREATININE-BSD FRML MDRD: >60 ML/MIN/{1.73_M2}
GLUCOSE BLD-MCNC: 122 MG/DL (ref 70–99)
GLUCOSE BLD-MCNC: 124 MG/DL (ref 70–99)
GLUCOSE BLD-MCNC: 128 MG/DL (ref 70–99)
GLUCOSE BLD-MCNC: 136 MG/DL (ref 70–99)
GLUCOSE BLD-MCNC: 184 MG/DL (ref 70–99)
HCT VFR BLD CALC: 37.3 % (ref 40.5–52.5)
HEMOGLOBIN: 12.6 G/DL (ref 13.5–17.5)
LYMPHOCYTES ABSOLUTE: 2 K/UL (ref 1–5.1)
LYMPHOCYTES RELATIVE PERCENT: 23.6 %
MAGNESIUM: 1.8 MG/DL (ref 1.8–2.4)
MCH RBC QN AUTO: 33 PG (ref 26–34)
MCHC RBC AUTO-ENTMCNC: 33.9 G/DL (ref 31–36)
MCV RBC AUTO: 97.3 FL (ref 80–100)
MONOCYTES ABSOLUTE: 1.3 K/UL (ref 0–1.3)
MONOCYTES RELATIVE PERCENT: 14.7 %
NEUTROPHILS ABSOLUTE: 4.9 K/UL (ref 1.7–7.7)
NEUTROPHILS RELATIVE PERCENT: 57.4 %
PDW BLD-RTO: 13 % (ref 12.4–15.4)
PERFORMED ON: ABNORMAL
PLATELET # BLD: 236 K/UL (ref 135–450)
PMV BLD AUTO: 8.9 FL (ref 5–10.5)
POTASSIUM REFLEX MAGNESIUM: 3.4 MMOL/L (ref 3.5–5.1)
RBC # BLD: 3.83 M/UL (ref 4.2–5.9)
SODIUM BLD-SCNC: 139 MMOL/L (ref 136–145)
WBC # BLD: 8.6 K/UL (ref 4–11)

## 2023-01-27 PROCEDURE — 83735 ASSAY OF MAGNESIUM: CPT

## 2023-01-27 PROCEDURE — 6360000002 HC RX W HCPCS: Performed by: INTERNAL MEDICINE

## 2023-01-27 PROCEDURE — 6370000000 HC RX 637 (ALT 250 FOR IP): Performed by: INTERNAL MEDICINE

## 2023-01-27 PROCEDURE — 97530 THERAPEUTIC ACTIVITIES: CPT

## 2023-01-27 PROCEDURE — 85025 COMPLETE CBC W/AUTO DIFF WBC: CPT

## 2023-01-27 PROCEDURE — 80048 BASIC METABOLIC PNL TOTAL CA: CPT

## 2023-01-27 PROCEDURE — 97535 SELF CARE MNGMENT TRAINING: CPT

## 2023-01-27 PROCEDURE — 99233 SBSQ HOSP IP/OBS HIGH 50: CPT | Performed by: INTERNAL MEDICINE

## 2023-01-27 PROCEDURE — 1200000000 HC SEMI PRIVATE

## 2023-01-27 PROCEDURE — 2580000003 HC RX 258: Performed by: INTERNAL MEDICINE

## 2023-01-27 RX ORDER — POTASSIUM CHLORIDE 20 MEQ/1
40 TABLET, EXTENDED RELEASE ORAL ONCE
Status: COMPLETED | OUTPATIENT
Start: 2023-01-27 | End: 2023-01-27

## 2023-01-27 RX ADMIN — CEFEPIME 2000 MG: 2 INJECTION, POWDER, FOR SOLUTION INTRAVENOUS at 17:36

## 2023-01-27 RX ADMIN — ASPIRIN 81 MG: 81 TABLET, CHEWABLE ORAL at 08:28

## 2023-01-27 RX ADMIN — ATENOLOL 25 MG: 25 TABLET ORAL at 08:28

## 2023-01-27 RX ADMIN — ATORVASTATIN CALCIUM 80 MG: 80 TABLET, FILM COATED ORAL at 08:28

## 2023-01-27 RX ADMIN — CEFEPIME 2000 MG: 2 INJECTION, POWDER, FOR SOLUTION INTRAVENOUS at 05:08

## 2023-01-27 RX ADMIN — ENOXAPARIN SODIUM 30 MG: 100 INJECTION SUBCUTANEOUS at 20:49

## 2023-01-27 RX ADMIN — LOSARTAN POTASSIUM 50 MG: 25 TABLET, FILM COATED ORAL at 08:27

## 2023-01-27 RX ADMIN — POTASSIUM CHLORIDE 40 MEQ: 1500 TABLET, EXTENDED RELEASE ORAL at 10:21

## 2023-01-27 RX ADMIN — VANCOMYCIN HYDROCHLORIDE 1000 MG: 1 INJECTION, POWDER, LYOPHILIZED, FOR SOLUTION INTRAVENOUS at 10:28

## 2023-01-27 RX ADMIN — ENOXAPARIN SODIUM 30 MG: 100 INJECTION SUBCUTANEOUS at 08:28

## 2023-01-27 ASSESSMENT — ENCOUNTER SYMPTOMS
RHINORRHEA: 0
CONSTIPATION: 0
SINUS PAIN: 0
SINUS PRESSURE: 0
NAUSEA: 0
BACK PAIN: 0
SORE THROAT: 0
DIARRHEA: 0
SHORTNESS OF BREATH: 0
WHEEZING: 0
COUGH: 0
ABDOMINAL PAIN: 0
EYE DISCHARGE: 0
EYE REDNESS: 0
ROS SKIN COMMENTS: BILATERAL LEG CELLULITIS

## 2023-01-27 NOTE — CARE COORDINATION
Discharge Planning Note:    Update:    Patient is from [address] [place] [address].    - If SNF is needed, place a referral with [facility]. - If [facility] is needed, place a referral with: Jose Chen [address]: Lesia: [phone]   Fax: [phone]    Will continue to follow.     MONISHA Barahona RN    [facility]  Phone: [phone]

## 2023-01-27 NOTE — PLAN OF CARE
Problem: Discharge Planning  Goal: Discharge to home or other facility with appropriate resources  Outcome: Progressing  Flowsheets (Taken 1/26/2023 0915 by Ronny Srinivasan RN)  Discharge to home or other facility with appropriate resources: Identify barriers to discharge with patient and caregiver     Problem: Safety - Adult  Goal: Free from fall injury  Outcome: Progressing

## 2023-01-27 NOTE — PROGRESS NOTES
[facility]    1/27/2023 9:51 AM        Name: Shahram Talamantes .               Admitted: 1/25/2023  Primary Care Provider: [facility] (Tel: None)        Subjective:    No nausea vomiting  Reviewed interval ancillary notes    Current Medications  potassium chloride (KLOR-CON M) extended release tablet 40 mEq, Once  vancomycin 1000 mg IVPB in 250 mL NS addavial, Q12H  aspirin chewable tablet 81 mg, Daily  atenolol (TENORMIN) tablet 25 mg, Daily  atorvastatin (LIPITOR) tablet 80 mg, Daily  losartan (COZAAR) tablet 50 mg, Daily  insulin lispro (HUMALOG) injection vial 0-8 Units, TID WC  insulin lispro (HUMALOG) injection vial 0-4 Units, Nightly  [Held by provider] furosemide (LASIX) injection 40 mg, Daily  sodium chloride flush 0.9 % injection 5-40 mL, 2 times per day  sodium chloride flush 0.9 % injection 5-40 mL, PRN  0.9 % sodium chloride infusion, PRN  enoxaparin Sodium (LOVENOX) injection 30 mg, BID  ondansetron (ZOFRAN-ODT) disintegrating tablet 4 mg, Q8H PRN   Or  ondansetron (ZOFRAN) injection 4 mg, Q6H PRN  polyethylene glycol (GLYCOLAX) packet 17 g, Daily PRN  acetaminophen (TYLENOL) tablet 650 mg, Q6H PRN   Or  acetaminophen (TYLENOL) suppository 650 mg, Q6H PRN  cefepime (MAXIPIME) 2,000 mg in sodium chloride 0.9 % 50 mL IVPB (mini-bag), Q12H  glucose-vitamin C chewable tablet 4 tablet, PRN  dextrose bolus 10% 125 mL, PRN   Or  dextrose bolus 10% 250 mL, PRN  glucagon (rDNA) injection 1 mg, PRN  dextrose 10 % infusion, Continuous PRN  sodium chloride flush 0.9 % injection 5-40 mL, 2 times per day  sodium chloride flush 0.9 % injection 5-40 mL, PRN  0.9 % sodium chloride infusion, PRN      Objective:  BP (!) 131/59   Pulse 78   Temp 97.4 °F (36.3 °C) (Oral)   Resp 18   Ht 6' (1.829 m)   Wt 235 lb (106.6 kg)   SpO2 95%   BMI 31.87 kg/m²     Intake/Output Summary (Last 24 hours) at 1/27/2023 1011 [facility] filed at 1/26/2023 2231  Gross per 24 hour   Intake 240 ml   Output --   Net 240 ml        Wt Readings from Last 3 Encounters:   01/25/23 235 lb (106.6 kg)   10/20/21 249 lb 8 oz (113.2 kg)   06/25/18 240 lb (108.9 kg)       General appearance:  Appears comfortable. AAOx3  HEENT: atraumatic, Pupils equal, muscous membranes moist, no masses appreciated  Cardiovascular: Regular rate and rhythm no murmurs appreciated  Respiratory: CTAB no wheezing  Gastrointestinal: Abdomen soft, non-tender, BS+  EXT: no edema  Neurology: no gross focal deficts  Psychiatry: Appropriate affect. Not agitated  Skin: biLateral lower extremity erythema improving    Labs and Tests:  CBC:   Recent Labs     01/25/23  1125 01/26/23  0509 01/27/23  0639   WBC 8.7 9.1 8.6   HGB 13.3* 12.7* 12.6*    237 236       BMP:    Recent Labs     01/25/23  1125 01/26/23  0509 01/27/23  0639    138 139   K 3.6 3.8 3.4*    100 101   CO2 23 28 26   BUN 21* 17 16   CREATININE 0.8 0.8 0.7*   GLUCOSE 150* 126* 124*       Hepatic:   Recent Labs     01/25/23  1125   AST 19   ALT 23   BILITOT 0.6   ALKPHOS 46       VL Extremity Venous Right   Final Result      XR CHEST PORTABLE   Final Result   1. No evidence of CHF. 2.  Mild left basilar airspace disease favored to represent atelectasis             Recent imaging reviewed    Problem List  Principal Problem:    Cellulitis  Active Problems:    Diabetic polyneuropathy associated with type 2 diabetes mellitus (HCC)    Bilateral lower extremity edema    Essential hypertension  Resolved Problems:    * No resolved hospital problems.  *       Assessment/Plan:   Bilateral lower ext cellulitis  - iv atbx  -culture thus far negative  - discussed with id one more day iv atbx atleast     + BLE: improved hold lasix echo normal ef      Htn: home meds      Hypokalemia: replace and recheck    DVT prophylaxis lovenox  Code status full code      Tressie Blizzard, MD   1/27/2023 9:51 AM

## 2023-01-27 NOTE — PROGRESS NOTES
Occupational Therapy    [facility] - Inpatient Rehabilitation Department   Phone: [phone]    Occupational Therapy    [] Initial Evaluation            [x] Daily Treatment Note         [] Discharge Summary      Patient: Fish Cristina   : 1935   MRN: [id number]   Date of Service:  2023    Admitting Diagnosis:  Cellulitis  Current Admission Summary:    Leg Swelling       Presents with swelling. States the swelling is a chronic issue. Now with weeping to right leg x several weeks. Denies pain.          HISTORY OF PRESENT ILLNESS: 1 or more Elements      History From: patient  Limitations to history : dementia     Fish Cristina is a 87 y.o. male with past medical history of hypertension, diabetes, PE who presents complaining of right leg swelling.  Patient is a poor historian, confused in triage, states his wife knows more and will be with him shortly.  Patient complaining of right leg swelling and redness for a few weeks.  States he has chronic wounds there, unsure how long they have been there.  He called PCP today because his wife was worried, they were directed to the ER to rule out DVT.  He has a history of PE , unsure if he is still on Eliquis.  Patient was not aware he had a PE in .  Denies fever, shortness of breath.    Patient admitted from ER, VSS. Patient alert and oriented x4. Assisted patient up to bathroom patient voided, assisted patient up in chair with chair alarm on and call light within reach. Patient states he uses a walker at baseline, patient fell a week ago in bedroom and scratched legs, bilateral LE are red with wounds that are weeping., right big toe with dressing. Left arm dressing in place per the ER nurse. Patient denies pain in bilateral LE.     Past Medical History:  has a past medical history of Diabetes mellitus (HCC), Hyperlipidemia, and Hypertension.  Past Surgical History:  has a past surgical history that includes angioplasty; Appendectomy; and  cyst removal.    Discharge Recommendations: Milagro Sifuentes scored a 17/24 on the AM-PAC ADL Inpatient form. Current research shows that an AM-PAC score of 18 or greater is typically associated with a discharge to the patient's home setting. Based on the patient's AM-PAC score, and their current ADL deficits, it is recommended that the patient have 2-3 sessions per week of Occupational Therapy at d/c to increase the patient's independence. At this time, this patient demonstrates the endurance and safety to discharge home with home OT2 (home vs OP services) and a follow up treatment frequency of 2-3x/wk. Please see assessment section for further patient specific details. If patient discharges prior to next session this note will serve as a discharge summary. Please see below for the latest assessment towards goals.         HOME HEALTH CARE: LEVEL 3 SAFETY     - Initial home health evaluation to occur within 24-48 hours, in patient home   - Therapy evaluations in home within 24-48 hours of discharge; including DME and home safety   - Frontload therapy 5 days, then 3x a week   - Therapy to evaluate if patient has [address] needs for personal care   -  evaluation within 24-48 hours, includes evaluation of resources and insurance to determine [place], [place], LTC, and Medicaid options    DME Required For Discharge: patient has all required DME for discharge    Precautions/Restrictions: high fall risk, up as tolerated  Weight Bearing Restrictions: no restrictions  [] Right Upper Extremity  [] Left Upper Extremity [] Right Lower Extremity  [] Left Lower Extremity     Required Braces/Orthotics: no braces required   [] Right  [] Left  Positional Restrictions:no positional restrictions    Pre-admission Information  Social/Functional History  Lives With: Spouse  Type of Home:  ([address] [place] [address])  Home Layout: One level  Home Access: Level entry  Bathroom Shower/Tub: walk-in shower with a built in seat with grab bars   Bathroom Toilet: Standard-- states is too low   Bathroom Equipment: Grab bars around toilet, Grab bars in shower  Bathroom Accessibility: Accessible  Home Equipment: Rolling walker, 4 prong Cane, Grab bars, rollator, Uses a RW in the apartment. Uses the rollator outside. Sleeps in a recliner. Receives Help From: Family  ADL Assistance: Independent  Homemaking Assistance: Independent-- has a cleaning lady once a week, shares cooking   Homemaking Responsibilities: Yes  Ambulation Assistance: Independent with RW  Transfer Assistance: Independent with RW  Active : Yes  Mode of Transportation: Car  Occupation: Retired  Type of occupation: Manufactor agent  Leisure & Hobbies: Sit and read  Additional Comments: 1 fall which resulted in this admission    ______________________________________________________________________________    Subjective  General: Patient supine in chair on arrival - wife at bedside - pt reports LBP but does not rate numerically and tolerates therapy well   Pain Interventions: repositioned         Activities of Daily Living  Basic Activities of Daily Living  Grooming: setup assistance  Grooming Comments: wash hands at sink    Lower Extremity Dressing: minimal assistance  Dressing Comments: pt able to kasandra hospital pants with min A - able to kasandra shoes over hospital socks with figure 4 technique with extra time required    Toileting: setup assistance. Toileting Comments: able to complete pericare - was not wearing LB clothing while toileting (gown only)  General Comments: declined bathing - donned pants and shoes from reclining chair    Instrumental Activities of Daily Living  No IADL completed on this date.     Functional Mobility  Bed Mobility  Supine to Sit: minimal assistance  Comments:  Transfers  Sit to stand transfer:stand by assistance  Stand to sit transfer: stand by assistance  Toilet transfer: stand by assistance  Toilet transfer equipment: raised toilet seat with rails  Toilet transfer comments: BSC over commode  - pt states he uses hand rails at home when getting on/off toilet   Comments:  Functional Mobility:  Functional Mobility: .  stand by assistance  Functional Mobility Activity: to/from bathroom, in hallway   Functional Mobility Device Use: rolling walker  Functional Mobility Comment: ambulated 150 feet with RW in hallway with assist to manage IV line, no LOB - pt did require cuing for proper hand placement when approaching chairs     Other Therapeutic Interventions    Functional Outcomes       Cognition  Overall Cognitive Status: Impaired  Arousal/Alterness: appropriate responses to stimuli  Following Commands: follows one step commands with increased time, follows one step commands consistently  Attention Span: appears intact  Memory: decreased recall of recent events, decreased short term memory  Safety Judgement: decreased awareness of need for assistance  Problem Solving: assistance required to generate solutions  Insights: decreased awareness of deficits  Initiation: does not require cues  Sequencing: requires cues for some  Comments: ER note dx of dementia, very hard of hearing which limits command following at times     Orientation:    alert and oriented x 4  Command Following:   accurately follows one step commands     Education  Barriers To Learning: cognition and hearing   Patient Education: patient educated on goals, OT role and benefits, plan of care, ADL adaptive strategies, transfer training, discharge recommendations - discussed concerns regarding recent hospital course and return to home including potential need to waterproof R LE and L UE for showering and fall safety strategies due to recent fall (I.e. use of night lights, reaching back for chairs) wife present and is a co-learner for patient   Learning Assessment:  patient verbalizes understanding, would benefit from continued reinforcement    Assessment  Activity Tolerance: Patient appears to be close to baseline for ADLs and mobility. Poor historian and contradicted self multiple time regarding timeline. Impairments Requiring Therapeutic Intervention: decreased functional mobility, decreased ADL status, decreased cognition, decreased endurance  Prognosis: good and fair  Clinical Assessment: Patient is below baseline level following admission to hospital with fall a week ago with now infection/cellulitis. Patient lives at [address] [place], [place] [address]ment with wife. Recommend home therapy. Patient would benefit from further OT at this time.    Safety Interventions: patient left in chair, chair alarm in place, call light within reach, patient at risk for falls, and nurse notified    Plan  Frequency: 3-5 x/per week  Current Treatment Recommendations: ROM, balance training, functional mobility training, transfer training, ADL/self-care training, and safety education    Goals  Patient Goals: Return home with wife   Short Term Goals:  Time Frame: until discharge  Patient will complete lower body ADL at minimal assistance - min A 1/27 - continue for consistency   Patient will complete toileting at set up assistance set up 1/27 - continue for consistency   Patient will complete functional transfers at set up assistance   Patient will increase [facility] ADL score = to or > than 21/24 - 20/21 1/27     Therapy Session Time     Individual Group Co-treatment   Time In  1230     Time Out  1310     Minutes  40          Timed Code Treatment Minutes:  40   Total Treatment Minutes:  40       Electronically Signed By: JIMBO Bullard/MIKE

## 2023-01-27 NOTE — PROGRESS NOTES
Infectious Diseases   Progress Note      Admission Date: 1/25/2023  Hospital Day: Hospital Day: 3   Attending: Dwight Carlson MD  Date of service: 1/27/2023     Chief complaint/ Reason for consult:     Severe bilateral extremity cellulitis  Right diabetic foot infection with wound involving the right hallux area  Right lower extremity wounds  Longstanding type 2 diabetes mellitus    Microbiology:        I have reviewed allavailable micro lab data and cultures    Blood culture (2/2) - collected on 1/25/2023: Negative      Antibiotics and immunizations:       Current antibiotics: All antibiotics and their doses were reviewed by me    Recent Abx Admin                     vancomycin 1000 mg IVPB in 250 mL NS addavial (mg) 1,000 mg New Bag 01/27/23 1028     1,000 mg New Bag 01/26/23 2101    cefepime (MAXIPIME) 2,000 mg in sodium chloride 0.9 % 50 mL IVPB (mini-bag) (mg) 2,000 mg New Bag 01/27/23 0508     2,000 mg New Bag 01/26/23 1707                      Immunization History: All immunization history was reviewed by me today. Immunization History   Administered Date(s) Administered    COVID-19, PFIZER PURPLE top, DILUTE for use, (age 15 y+), 30mcg/0.3mL 01/22/2021, 02/12/2021, 10/05/2021       Known drug allergies: All allergies were reviewed and updated    Allergies   Allergen Reactions    Lisinopril Cough    Simvastatin      Reaction unknown       Social history:     Social History:  All social andepidemiologic history was reviewed and updated by me today as needed. Tobacco use:   reports that he quit smoking about 8 years ago. His smoking use included cigarettes. He has never used smokeless tobacco.  Alcohol use:   reports current alcohol use of about 1.0 standard drink per week. Currently lives in: [place] [address]   reports no history of drug use.      COVID VACCINATION AND LAB RESULT RECORDS:     Internal Administration   First Dose COVID-19, PFIZER PURPLE top, DILUTE for use, (age 15 y+), 30mcg/0.3mL  01/22/2021   Second Dose COVID-19, PFIZER PURPLE top, DILUTE for use, (age 15 y+), 30mcg/0.3mL   02/12/2021       Last COVID Lab No results found for: SARS-COV-2, SARS-COV-2 RNA, SARS-COV-2, SARS-COV-2, SARS-COV-2 BY PCR, SARS-COV-2, SARS-COV-2, SARS-COV-2         Assessment:     The patient is a 82 y.o. old male who  has a past medical history of Diabetes mellitus ([facility] Hyperlipidemia, and Hypertension. with following problems:    Severe bilateral extremity cellulitis-currently on IV vancomycin and cefepime  Right diabetic foot infection with wound involving the right hallux area-she is covered with broad-spectrum antibiotics  Right lower extremity wounds  Longstanding type 2 diabetes mellitus-maintaining good glucose control  Diabetic polyneuropathy  Essential hypertension  Mixed hyperlipidemia      Discussion:      The patient is afebrile. He is on IV vancomycin and IV cefazolin. He has been tolerating antibiotics okay. Right leg wound culture is in process. Gram stain shows gram-positive cocci. Blood cultures from admission have been negative so far    Serum creatinine 0.7. CRP was less than 3 on January 25. Sed rate was 27.    2D echo done yesterday showed ejection fraction of 55 to 60% without any obvious valvular vegetations    Plan:     Diagnostic Workup:    Follow-up on pending cultures  Continue to follow  fever curve, WBC count and blood cultures. Continue to monitor blood counts, liver and renal function. Antimicrobials: Will continue IV vancomycin for empiric gram-positive coverage  Check Vancomycin trough before the 5th dose. Target vancomycin trough level of 15-20  mg/L or vancomycin area under curve (AUC) of 400-600 mg*h/L by Bayesian modeling method. If dosing vancomycin based on trough levels, keep vancomycin trough below 20 at all times.   Avoid increasing the dose of vancomycin above a total of 4 grams in a 24-hour period in patients younger than 45 years and above 3 grams in a 24-hour period in a patient of age 39 years or older. Continue to monitor serum creatinine and Vanco levels closely, while the patient is on I/v Vancomycin. Continue IV cefepime 2 g every 12 hour for empiric gram-negative coverage  Keep both legs elevated while lying down or sitting up  Slow improvement is expected  Pain control per primary  We will follow up on the culture results and clinical progress and will make further recommendations accordingly. Continue close vitals monitoring. Maintain good glycemic control. Fall precautions. Aspiration precautions. Continue to watch for new fever or diarrhea. DVT prophylaxis. Discussed all above with patient and RN. Drug Monitoring:    Continue monitoring for antibiotic toxicity as follows: CBC, CMP   Continue to watch for following: new or worsening fever, new hypotension, hives, lip swelling and redness or purulence at vascular access sites. I/v access Management:    Continue to monitor i.v access sites for erythema, induration, discharge or tenderness. As always, continue efforts to minimize tubes/lines/drains as clinically appropriate to reduce chances of line associated infections. Patient education and counseling: The patient was educated in detail about the side-effects of various antibiotics and things to watch for like new rashes, lip swelling, severe reaction, worsening diarrhea, break through fever etc.  Discussed patient's condition and what to expect. All of the patient's questions were addressed in a satisfactory manner and patient verbalized understanding all instructions. Level of complexity of visit and medical decision making: High     TIME SPENT TODAY:     - Spent over  36 minutes on visit (including interval history, physical exam, review of data including labs, cultures, imaging, development and implementation of treatment plan and coordination of complex care).  More than 50 percent of this includes face-to-face time spent with the patient for counseling and coordination of care. Thank you for involving me in the care of your patient. I will continue to follow. If you have anyadditional questions, please do not hesitate to contact me. Subjective: Interval history: Interval history was obtained from chart review and patient/ RN. He is afebrile. He has been tolerating antibiotics okay. No diarrhea. REVIEW OF SYSTEMS:     Review of Systems   Constitutional:  Negative for chills, diaphoresis and fever. HENT:  Negative for ear discharge, ear pain, postnasal drip, rhinorrhea, sinus pressure, sinus pain and sore throat. Eyes:  Negative for discharge and redness. Respiratory:  Negative for cough, shortness of breath and wheezing. Cardiovascular:  Negative for chest pain and leg swelling. Gastrointestinal:  Negative for abdominal pain, constipation, diarrhea and nausea. Endocrine: Negative for cold intolerance, heat intolerance and polydipsia. Genitourinary:  Negative for dysuria, flank pain, frequency, hematuria and urgency. Musculoskeletal:  Negative for back pain and myalgias. Skin:  Negative for rash. Bilateral leg cellulitis   Allergic/Immunologic: Negative for immunocompromised state. Neurological:  Negative for dizziness, seizures and headaches. Hematological:  Does not bruise/bleed easily. Psychiatric/Behavioral:  Negative for agitation, hallucinations and suicidal ideas. The patient is not nervous/anxious. All other systems reviewed and are negative. Past Medical History: All past medical history reviewed today. Past Medical History:   Diagnosis Date    Diabetes mellitus ([facility] [address]     Type 2    Hyperlipidemia     Hypertension        Past Surgical History: All past surgical history was reviewed today.     Past Surgical History:   Procedure Laterality Date    ANGIOPLASTY      had 2 angioplasty around age 50 and age 64    APPENDECTOMY      age 44's CYST REMOVAL      cyst on neck 2022; several on back       Family History: All family history was reviewed today. Problem Relation Age of Onset    Heart Disease Father     Colon Cancer Father     Colon Cancer Sister     Diabetes Maternal Grandfather        Objective:       PHYSICAL EXAM:      Vitals:   Vitals:    01/26/23 2045 01/27/23 0021 01/27/23 0512 01/27/23 0815   BP: 129/69 126/72 127/69 (!) 131/59   Pulse: 67 66 66 78   Resp: 18 16 18 18   Temp: 97.8 °F (36.6 °C) 97.6 °F (36.4 °C) 97.6 °F (36.4 °C) 97.4 °F (36.3 °C)   TempSrc: Oral Oral Oral Oral   SpO2: 95% 95% 96% 95%   Weight:       Height:           Physical Exam  Vitals and nursing note reviewed. Constitutional:       Appearance: He is well-developed. He is not diaphoretic. Comments: The patient was seen earlier today. HENT:      Head: Normocephalic and atraumatic. Right Ear: External ear normal. There is no impacted cerumen. Left Ear: External ear normal. There is no impacted cerumen. Nose: Nose normal.      Mouth/Throat:      Mouth: Mucous membranes are moist.      Pharynx: Oropharynx is clear. No oropharyngeal exudate. Eyes:      General: No scleral icterus. Right eye: No discharge. Left eye: No discharge. Conjunctiva/sclera: Conjunctivae normal.      Pupils: Pupils are equal, round, and reactive to light. Neck:      Thyroid: No thyromegaly. Cardiovascular:      Rate and Rhythm: Normal rate and regular rhythm. Heart sounds: Normal heart sounds. No murmur heard. No friction rub. Pulmonary:      Effort: No respiratory distress. Breath sounds: No stridor. No wheezing or rales. Abdominal:      General: Bowel sounds are normal.      Palpations: Abdomen is soft. Tenderness: There is no abdominal tenderness. There is no guarding or rebound. Musculoskeletal:         General: Swelling present. No tenderness or deformity. Normal range of motion.       Cervical back: Normal range of motion and neck supple. Right lower leg: No edema. Left lower leg: No edema. Lymphadenopathy:      Cervical: No cervical adenopathy. Skin:     General: Skin is warm and dry. Coloration: Skin is not jaundiced. Findings: No bruising, erythema or rash. Comments: Bilateral cellulitis, right leg wounds noted again   Neurological:      General: No focal deficit present. Mental Status: He is alert and oriented to person, place, and time. Mental status is at baseline. Motor: No abnormal muscle tone. Psychiatric:         Mood and Affect: Mood normal.         Behavior: Behavior normal.      *    Lines and drains: All vascular access sites are healthy with no local erythema, discharge or tenderness. Intake and output:    I/O last 3 completed shifts: In: 480 [P.O.:480]  Out: 800 [Urine:800]    Lab Data:   All available labs and old records have been reviewed by me. CBC:  Recent Labs     01/25/23  1125 01/26/23  0509 01/27/23  0639   WBC 8.7 9.1 8.6   RBC 4.10* 3.79* 3.83*   HGB 13.3* 12.7* 12.6*   HCT 40.2* 37.0* 37.3*    237 236   MCV 98.0 97.6 97.3   MCH 32.5 33.5 33.0   MCHC 33.1 34.3 33.9   RDW 12.9 13.2 13.0          BMP:  Recent Labs     01/25/23  1125 01/26/23  0509 01/27/23  0639    138 139   K 3.6 3.8 3.4*    100 101   CO2 23 28 26   BUN 21* 17 16   CREATININE 0.8 0.8 0.7*   CALCIUM 10.0 9.5 9.6   GLUCOSE 150* 126* 124*          Hepatic Function Panel:   Lab Results   Component Value Date/Time    ALKPHOS 46 01/25/2023 11:25 AM    ALT 23 01/25/2023 11:25 AM    AST 19 01/25/2023 11:25 AM    PROT 7.2 01/25/2023 11:25 AM    BILITOT 0.6 01/25/2023 11:25 AM    LABALBU 3.8 01/25/2023 11:25 AM       CPK: No results found for: CKTOTAL  ESR:   Lab Results   Component Value Date    SEDRATE 27 (H) 01/25/2023     CRP:   Lab Results   Component Value Date    CRP <3.0 01/25/2023           Imaging:     All pertinent images and reports for the current visit were reviewed by me during this visit. I reviewed the chest x-ray/CT scan/MRI images and independently interpreted the findings and results today. VL Extremity Venous Right   Final Result      XR CHEST PORTABLE   Final Result   1. No evidence of CHF. 2.  Mild left basilar airspace disease favored to represent atelectasis             Medications: All current and past medications were reviewed. vancomycin  1,000 mg IntraVENous Q12H    aspirin  81 mg Oral Daily    atenolol  25 mg Oral Daily    atorvastatin  80 mg Oral Daily    losartan  50 mg Oral Daily    insulin lispro  0-8 Units SubCUTAneous TID WC    insulin lispro  0-4 Units SubCUTAneous Nightly    [Held by provider] furosemide  40 mg IntraVENous Daily    sodium chloride flush  5-40 mL IntraVENous 2 times per day    enoxaparin  30 mg SubCUTAneous BID    cefepime  2,000 mg IntraVENous Q12H    sodium chloride flush  5-40 mL IntraVENous 2 times per day        sodium chloride 5 mL/hr at 01/25/23 1649    dextrose      sodium chloride         sodium chloride flush, sodium chloride, ondansetron **OR** ondansetron, polyethylene glycol, acetaminophen **OR** acetaminophen, glucose, dextrose bolus **OR** dextrose bolus, glucagon (rDNA), dextrose, sodium chloride flush, sodium chloride      Problem list:       Patient Active Problem List   Diagnosis Code    Pulmonary embol[facility] ([facility] I26.99    Essential hypertension I10    DM2 (diabetes mellitus, type 2) (HCC) E11.9    Hyperlipidemia E78.5    Obesity (BMI 30-39. 9) E66.9    Hyponatremia E87.1    Cellulitis L03.90    Diabetic polyneuropathy associated with type 2 diabetes mellitus ([facility] [address] E11.42    Bilateral lower extremity edema R60.0       Please note that this chart was generated using Dragon dictation software. Although every effort was made to ensure the accuracy of this automated transcription, some errors in transcription may have occurred inadvertently.  If you may need any clarification, please do not hesitate to contact me through [facility] or at the phone number provided below with my electronic signature. Any pictures or media included in this note were obtained after taking informed verbal consent from the patient and with their approval to include those in the patient's medical record. Sukhjinder Vasquez MD, MPH, [address] [place] [address]  1/27/2023, 12:13 PM  [facility] Infectious Disease   [address] [place] [address], [place] [address] [address] [facility], [address] [place] [address]  Office: [phone]  Fax: [phone]  In-person Clinic days:  Tuesday & Thursday a.m. Virtual clinic days: Monday, Wednesday & Friday a.m.

## 2023-01-28 VITALS
BODY MASS INDEX: 31.83 KG/M2 | HEART RATE: 69 BPM | TEMPERATURE: 97.3 F | HEIGHT: 72 IN | OXYGEN SATURATION: 96 % | SYSTOLIC BLOOD PRESSURE: 127 MMHG | DIASTOLIC BLOOD PRESSURE: 57 MMHG | WEIGHT: 235 LBS | RESPIRATION RATE: 16 BRPM

## 2023-01-28 LAB
ANION GAP SERPL CALCULATED.3IONS-SCNC: 10 MMOL/L (ref 3–16)
BASOPHILS ABSOLUTE: 0.1 K/UL (ref 0–0.2)
BASOPHILS RELATIVE PERCENT: 1 %
BUN BLDV-MCNC: 13 MG/DL (ref 7–20)
CALCIUM SERPL-MCNC: 9.8 MG/DL (ref 8.3–10.6)
CHLORIDE BLD-SCNC: 101 MMOL/L (ref 99–110)
CO2: 24 MMOL/L (ref 21–32)
CREAT SERPL-MCNC: 0.6 MG/DL (ref 0.8–1.3)
EOSINOPHILS ABSOLUTE: 0.3 K/UL (ref 0–0.6)
EOSINOPHILS RELATIVE PERCENT: 4.1 %
GFR SERPL CREATININE-BSD FRML MDRD: >60 ML/MIN/{1.73_M2}
GLUCOSE BLD-MCNC: 119 MG/DL (ref 70–99)
GLUCOSE BLD-MCNC: 125 MG/DL (ref 70–99)
GLUCOSE BLD-MCNC: 127 MG/DL (ref 70–99)
GLUCOSE BLD-MCNC: 140 MG/DL (ref 70–99)
GRAM STAIN RESULT: ABNORMAL
HCT VFR BLD CALC: 37.4 % (ref 40.5–52.5)
HEMOGLOBIN: 12.9 G/DL (ref 13.5–17.5)
LYMPHOCYTES ABSOLUTE: 2.5 K/UL (ref 1–5.1)
LYMPHOCYTES RELATIVE PERCENT: 31.2 %
MCH RBC QN AUTO: 33.8 PG (ref 26–34)
MCHC RBC AUTO-ENTMCNC: 34.4 G/DL (ref 31–36)
MCV RBC AUTO: 98.2 FL (ref 80–100)
MONOCYTES ABSOLUTE: 1 K/UL (ref 0–1.3)
MONOCYTES RELATIVE PERCENT: 12.5 %
NEUTROPHILS ABSOLUTE: 4.2 K/UL (ref 1.7–7.7)
NEUTROPHILS RELATIVE PERCENT: 51.2 %
PDW BLD-RTO: 13.2 % (ref 12.4–15.4)
PERFORMED ON: ABNORMAL
PLATELET # BLD: 243 K/UL (ref 135–450)
PMV BLD AUTO: 8.4 FL (ref 5–10.5)
POTASSIUM REFLEX MAGNESIUM: 4 MMOL/L (ref 3.5–5.1)
POTASSIUM SERPL-SCNC: 4 MMOL/L (ref 3.5–5.1)
RBC # BLD: 3.81 M/UL (ref 4.2–5.9)
SODIUM BLD-SCNC: 135 MMOL/L (ref 136–145)
WBC # BLD: 8.2 K/UL (ref 4–11)
WOUND/ABSCESS: ABNORMAL

## 2023-01-28 PROCEDURE — 6360000002 HC RX W HCPCS: Performed by: INTERNAL MEDICINE

## 2023-01-28 PROCEDURE — 2580000003 HC RX 258: Performed by: INTERNAL MEDICINE

## 2023-01-28 PROCEDURE — 36415 COLL VENOUS BLD VENIPUNCTURE: CPT

## 2023-01-28 PROCEDURE — 85025 COMPLETE CBC W/AUTO DIFF WBC: CPT

## 2023-01-28 PROCEDURE — 80048 BASIC METABOLIC PNL TOTAL CA: CPT

## 2023-01-28 PROCEDURE — 6370000000 HC RX 637 (ALT 250 FOR IP): Performed by: INTERNAL MEDICINE

## 2023-01-28 RX ORDER — CEFUROXIME AXETIL 500 MG/1
500 TABLET ORAL 2 TIMES DAILY
Qty: 28 TABLET | Refills: 0 | Status: SHIPPED | OUTPATIENT
Start: 2023-01-28 | End: 2023-02-11

## 2023-01-28 RX ORDER — DOXYCYCLINE HYCLATE 100 MG
100 TABLET ORAL 2 TIMES DAILY
Qty: 28 TABLET | Refills: 0 | Status: SHIPPED | OUTPATIENT
Start: 2023-01-28 | End: 2023-02-11

## 2023-01-28 RX ADMIN — VANCOMYCIN HYDROCHLORIDE 1000 MG: 1 INJECTION, POWDER, LYOPHILIZED, FOR SOLUTION INTRAVENOUS at 07:55

## 2023-01-28 RX ADMIN — CEFEPIME 2000 MG: 2 INJECTION, POWDER, FOR SOLUTION INTRAVENOUS at 03:26

## 2023-01-28 RX ADMIN — ENOXAPARIN SODIUM 30 MG: 100 INJECTION SUBCUTANEOUS at 09:12

## 2023-01-28 RX ADMIN — LOSARTAN POTASSIUM 50 MG: 25 TABLET, FILM COATED ORAL at 09:12

## 2023-01-28 RX ADMIN — ATORVASTATIN CALCIUM 80 MG: 80 TABLET, FILM COATED ORAL at 09:11

## 2023-01-28 RX ADMIN — ATENOLOL 25 MG: 25 TABLET ORAL at 09:11

## 2023-01-28 RX ADMIN — VANCOMYCIN HYDROCHLORIDE 1000 MG: 1 INJECTION, POWDER, LYOPHILIZED, FOR SOLUTION INTRAVENOUS at 00:34

## 2023-01-28 RX ADMIN — ASPIRIN 81 MG: 81 TABLET, CHEWABLE ORAL at 09:12

## 2023-01-28 ASSESSMENT — PAIN SCALES - GENERAL: PAINLEVEL_OUTOF10: 2

## 2023-01-28 ASSESSMENT — PAIN DESCRIPTION - LOCATION: LOCATION: LEG

## 2023-01-28 ASSESSMENT — PAIN DESCRIPTION - ORIENTATION: ORIENTATION: RIGHT;LEFT

## 2023-01-28 NOTE — DISCHARGE INSTR - COC
Continuity of Care Form    Patient Name: Diana Arriola   :  1935  MRN:  [id number]    Admit date:  2023  Discharge date:  23    Code Status Order: Full Code   Advance Directives:     Admitting Physician:  Trupti Walker MD  PCP: Eduin Fu [id number]    Discharging Nurse: Reginaldo Cerna Unit/Room#: 5HB-3071/9637-81  Discharging Unit Phone Number: [phone]    Emergency Contact:   Extended Emergency Contact Information  Primary Emergency Contact: Rosemary Cristina  Home Phone: [phone]  Relation: Spouse  Secondary Emergency Contact: Darcie Mistry  Home Phone: [phone]  Relation: Child    Past Surgical History:  Past Surgical History:   Procedure Laterality Date    ANGIOPLASTY      had 2 angioplasty around age 50 and age 64    APPENDECTOMY      age 44's    CYST REMOVAL      cyst on neck ; several on back       Immunization History:   Immunization History   Administered Date(s) Administered    COVID-19, PFIZER PURPLE top, DILUTE for use, (age 15 y+), 30mcg/0.3mL 2021, 2021, 10/05/2021       Active Problems:  Patient Active Problem List   Diagnosis Code    Pulmonary embolism and infarction ([facility] I26.99    Essential hypertension I10    DM2 (diabetes mellitus, type 2) ([facility] E11.9    Hyperlipidemia E78.5    Obesity (BMI 30-39. 9) E66.9    Hyponatremia E87.1    Cellulitis L03.90    Diabetic polyneuropathy associated with type 2 diabetes mellitus (HCC) E11.42    Bilateral lower extremity edema R60.0       Isolation/Infection:   Isolation            No Isolation          Patient Infection Status       None to display            Nurse Assessment:  Last Vital Signs: BP (!) 127/57   Pulse 69   Temp 97.3 °F (36.3 °C) (Oral)   Resp 16   Ht 6' (1.829 m)   Wt 235 lb (106.6 kg)   SpO2 96%   BMI 31.87 kg/m²     Last documented pain score (0-10 scale): Pain Level: 2  Last Weight:   Wt Readings from Last 1 Encounters:   23 235 lb (106.6 kg)     Mental Status:  oriented and alert    IV Access:  - None    Nursing Mobility/ADLs:  Walking   Assisted  Transfer  Assisted  Bathing  Assisted  Dressing  Assisted  Toileting  Assisted  Feeding  Independent  Med Admin  Assisted  Med Delivery   whole    Wound Care Documentation and Therapy:  Wound 01/26/23 Pretibial Right larger wound rt lower leg Cellulitis (Active)   Wound Image   01/26/23 1520   Wound Etiology Other 01/28/23 0921   Dressing Status Clean;Dry; Intact 01/28/23 0921   Wound Cleansed Not Cleansed 01/28/23 0921   Dressing/Treatment Foam 01/28/23 0921   Wound Assessment Pink/red 01/28/23 0921   Drainage Amount Small 01/28/23 0921   Drainage Description Clear 01/28/23 0921   Odor None 01/28/23 0921   Kacie-wound Assessment Blanchable erythema 01/28/23 0921   Margins Defined edges 01/28/23 0921   Number of days: 2       Wound 01/26/23 Pretibial Right smaller wound to right lower leg (Active)   Wound Image   01/26/23 1520   Dressing/Treatment Foam 01/28/23 0921   Wound Assessment Pink/red 01/28/23 0921   Drainage Amount Small 01/28/23 0921   Drainage Description Clear 01/28/23 0921   Kacie-wound Assessment Non-blanchable erythema; Warm 01/28/23 0921   Margins Attached edges; Defined edges 01/28/23 9628   Number of days: 2        Elimination:  Continence: Bowel: Yes  Bladder: Yes  Urinary Catheter: None   Colostomy/Ileostomy/Ileal Conduit: No       Date of Last BM:   No intake or output data in the 24 hours ending 01/28/23 1214  I/O last 3 completed shifts: In: 240 [P.O.:240]  Out: -     Safety Concerns:     None    Impairments/Disabilities:      Hearing    Nutrition Therapy:  Current Nutrition Therapy:   - Oral Diet:  General    Routes of Feeding: Oral  Liquids: No Restrictions  Daily Fluid Restriction: no  Last Modified Barium Swallow with Video (Video Swallowing Test): not done    Treatments at the Time of Hospital Discharge:   Respiratory Treatments:   Oxygen Therapy:  is not on home oxygen therapy.   Ventilator: - No ventilator support    Rehab Therapies:   Weight Bearing Status/Restrictions: No weight bearing restrictions  Other Medical Equipment (for information only, NOT a DME order):  walker  Other Treatments:     Patient's personal belongings (please select all that are sent with patient):  None    RN SIGNATURE:  Electronically signed by Bartolome Emery RN on 1/28/23 at 1:13 PM EST    CASE MANAGEMENT/SOCIAL WORK SECTION    Inpatient Status Date: 01/25/2023 - 01/28/2023    Readmission Risk Assessment Score:  Readmission Risk              Risk of Unplanned Readmission:  11           Discharging to Facility/ [address]  [address], [address]  Phone: [phone]  Fax: [phone]         / signature: Electronically signed by Deepak Barr RN on 1/28/23 at 12:46 PM EST    PHYSICIAN SECTION    Prognosis: Fair    Condition at Discharge: Stable    Rehab Potential (if transferring to Rehab): Fair    Recommended Labs or Other Treatments After Discharge:     Physician Certification: I certify the above information and transfer of Noah Peguero  is necessary for the continuing treatment of the diagnosis listed and that he requires [address] for greater 30 days.      Update Admission H&P: No change in H&P    PHYSICIAN SIGNATURE:  Electronically signed by Asha Alcazar MD on 1/28/23 at 1:47 PM EST

## 2023-01-28 NOTE — PROGRESS NOTES
Gave d/c instructions with list of active meds and when they are next due. Reviewed discharge instructions at bedside and provided printed copy of same. Pt verbalized understanding of all instructions. Denied additional questions. To home as passenger in private vehicle, taken to vehicle by staff.

## 2023-01-28 NOTE — DISCHARGE SUMMARY
Hospital Medicine Discharge Summary    Patient: Saurav Daniels     Gender: male  : 1935   Age: 82 y.o. 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 ([facility] [E11.42] 2023     Priority: Medium    Bilateral lower extremity edema [R60.0] 2023     Priority: Medium    Essential hypertension [I10] 10/20/2021     Condition at Discharge:  [address] Course: To hospital with bilateral lower extremity cellulitis treated with IV antibiotics cultures remain negative discussed with ID will be discharged on a course of Ceftin and doxycycline patient bilateral lower extremity edema which improved with IV Lasix had echo performed which showed normal EF patient was discharged follow-up with PCP  Discharge Exam:    BP (!) 127/57   Pulse 69   Temp 97.3 °F (36.3 °C) (Oral)   Resp 16   Ht 6' (1.829 m)   Wt 235 lb (106.6 kg)   SpO2 96%   BMI 31.87 kg/m²   General appearance:  Appears comfortable. AAOx3  HEENT: atraumatic, Pupils equal, muscous membranes moist, no masses appreciated  Cardiovascular: Regular rate and rhythm no murmurs appreciated  Respiratory: CTAB no wheezing  Gastrointestinal: Abdomen soft, non-tender, BS+  EXT: no edema  Neurology: no gross focal deficts  Psychiatry: Appropriate affect.  Not agitated  Skin: biLateral lower extremity erythema improving       Discharge Medications:   Current Discharge Medication List        START taking these medications    Details   cefUROXime (CEFTIN) 500 MG tablet Take 1 tablet by mouth 2 times daily for 14 days  Qty: 28 tablet, Refills: 0   doxycycline hyclate (VIBRA-TABS) 100 MG tablet Take 1 tablet by mouth 2 times daily for 14 days  Qty: 28 tablet, Refills: 0           Current Discharge Medication List        Current Discharge Medication List        CONTINUE these medications which have NOT CHANGED    Details   metFORMIN (GLUCOPHAGE) 500 MG tablet Take 500 mg by mouth daily (with breakfast)       atorvastatin (LIPITOR) 80 MG tablet Take 80 mg by mouth daily      atenolol (TENORMIN) 25 MG tablet Take 25 mg by mouth daily      losartan (COZAAR) 50 MG tablet Take 50 mg by mouth daily      aspirin 81 MG chewable tablet Take 81 mg by mouth daily           Current Discharge Medication List        STOP taking these medications       apixaban starter pack (ELIQUIS DVT/PE STARTER PACK) 5 MG TBPK tablet Comments:   Reason for Stopping:         vitamin D (CHOLECALCIFEROL) 1000 UNIT TABS tablet Comments:   Reason for Stopping:               Labs: For convenience and continuity at follow-up the following most recent labs are provided:    Lab Results   Component Value Date/Time    WBC 8.2 01/28/2023 06:58 AM    HGB 12.9 01/28/2023 06:58 AM    HCT 37.4 01/28/2023 06:58 AM    MCV 98.2 01/28/2023 06:58 AM     01/28/2023 06:58 AM     01/28/2023 06:58 AM    K 4.0 01/28/2023 06:58 AM    K 4.0 01/28/2023 06:58 AM     01/28/2023 06:58 AM    CO2 24 01/28/2023 06:58 AM    BUN 13 01/28/2023 06:58 AM    CREATININE 0.6 01/28/2023 06:58 AM    CALCIUM 9.8 01/28/2023 06:58 AM    ALKPHOS 46 01/25/2023 11:25 AM    ALT 23 01/25/2023 11:25 AM    AST 19 01/25/2023 11:25 AM    BILITOT 0.6 01/25/2023 11:25 AM    LABALBU 3.8 01/25/2023 11:25 AM     Lab Results   Component Value Date    INR 1.13 01/25/2023       Radiology:  XR CHEST PORTABLE    Result Date: 1/25/2023  EXAMINATION: ONE XRAY VIEW OF THE CHEST 1/25/2023 11:32 am COMPARISON: None. HISTORY: ORDERING SYSTEM PROVIDED HISTORY: BLE edema, hypoxia TECHNOLOGIST PROVIDED HISTORY: Reason for exam:->BLE edema,  hypoxia Reason for Exam: Leg Swelling (Presents with swelling. States the swelling is a chronic issue. Now with weeping to right leg x several weeks. Denies pain. ) FINDINGS: Normal cardiomediastinal silhouette. Mild left basilar airspace disease favored to represent atelectasis. No evidence of interstitial edema. No pneumothorax or pleural effusion     1. No evidence of CHF. 2.  Mild left basilar airspace disease favored to represent atelectasis     VL Extremity Venous Right    Result Date: 1/25/2023  Lower Extremities DVT Study  Demographics   Patient Name        Ann Díaz   Date of Study       01/25/2023  Gender                 Male   Patient Number      [id number]  Date of Birth          1935   Visit Number        [id number]   Age                    82 year(s)   Accession Number    [id number]  Room Number            7130   Corporate ID        [id number]    Sonographer            Judie Stewart RVT   Ordering Physician              Interpreting Physician [facility]                                                         Smita Uribe MD,                                                         [address]  Procedure Type of Study:   Veins:Lower Extremities DVT Study, VL EXTREMITY VENOUS DUPLEX RIGHT. Vascular Sonographer Report  Additional Indications:Right leg swelling redness. Impressions Right Impression No evidence of deep vein or superficial vein thrombosis involving the right lower extremity and the left common femoral vein. The peroneal veins could only be seen below calf. Conclusions   Summary   -No evidence of deep vein or superficial vein thrombosis involving the right  lower extremity and the left common femoral vein. -The peroneal veins could only be seen below calf.    Signature   ------------------------------------------------------------------  Electronically signed by Smita Uribe MD, [address] (Interpreting  physician) on 01/25/2023 at 04:49 PM ------------------------------------------------------------------  Patient Status:STAT. [facility]. Technical Quality:Limited visualization due to swelling. Velocities are measured in cm/s ; Diameters are measured in mm Right Lower Extremities DVT Study Measurements Right 2D Measurements +------------------------+----------+---------------+----------+ ! Location                ! Visualized! Compressibility! Thrombosis! +------------------------+----------+---------------+----------+ ! Sapheno Femoral Junction! Yes       ! Yes            ! None      ! +------------------------+----------+---------------+----------+ ! GSV Thigh               ! Yes       ! Yes            ! None      ! +------------------------+----------+---------------+----------+ ! Common Femoral          !Yes       ! Yes            ! None      ! +------------------------+----------+---------------+----------+ ! Prox Femoral            !Yes       ! Yes            ! None      ! +------------------------+----------+---------------+----------+ ! Mid Femoral             !Yes       ! Yes            ! None      ! +------------------------+----------+---------------+----------+ ! Dist Femoral            !Yes       ! Yes            ! None      ! +------------------------+----------+---------------+----------+ ! Deep Femoral            !Yes       ! Yes            ! None      ! +------------------------+----------+---------------+----------+ ! Popliteal               !Yes       ! Yes            ! None      ! +------------------------+----------+---------------+----------+ ! GSV Below Knee          ! Yes       ! Yes            ! None      ! +------------------------+----------+---------------+----------+ ! Gastroc                 ! Yes       ! Yes            ! None      ! +------------------------+----------+---------------+----------+ ! Soleal                  !Yes       ! Yes            ! None      ! +------------------------+----------+---------------+----------+ ! PTV                     ! Yes       ! Yes            ! None      ! +------------------------+----------+---------------+----------+ ! Peroneal                !Partial   !Yes            ! None      ! +------------------------+----------+---------------+----------+ ! GSV Calf                ! Yes       ! Yes            ! None      ! +------------------------+----------+---------------+----------+ ! SSV                     ! Yes       ! Yes            ! None      ! +------------------------+----------+---------------+----------+ Right Doppler Measurements +------------------------+------+------+------------+ ! Location                ! Signal!Reflux! Reflux (sec)! +------------------------+------+------+------------+ ! Sapheno Femoral Junction! Phasic!      !            ! +------------------------+------+------+------------+ ! Common Femoral          !Phasic!      !            ! +------------------------+------+------+------------+ ! Prox Femoral            !Phasic!      !            ! +------------------------+------+------+------------+ ! Deep Femoral            !Phasic!      !            ! +------------------------+------+------+------------+ ! Popliteal               !Phasic!      !            ! +------------------------+------+------+------------+ Left Lower Extremities DVT Study Measurements Left 2D Measurements +--------------+----------+---------------+----------+ ! Location      ! Visualized! Compressibility! Thrombosis! +--------------+----------+---------------+----------+ ! Common Femoral!Yes       ! Yes            ! None      ! +--------------+----------+---------------+----------+ Left Doppler Measurements +--------------+------+------+------------+ ! Location      ! Signal!Reflux! Reflux (sec)! +--------------+------+------+------------+ ! Common Femoral!Phasic!      !            ! +--------------+------+------+------------+        Signed:    Gertrudis Whitehead, MD   1/28/2023

## 2023-01-28 NOTE — CARE COORDINATION
Spoke to patient and spouse. Both are agreeable to using to using home care at discharge. Spouse noted that she spoke to their insurance company ([address]) and they would like a referral placed to [facility]. CM phoned [address] @ [phone] to place referral. Answering service received call and on call nurse is expected to return call. MD notified of need to enter home care order & complete [address]  CM received a call back from Donavon Ludwig with [address] Referral information can be faxed to [phone]. Donavon Ludwig noted that the agency will reach out to patients primary care to get orders to initiate home care services.     Damaris Christianson 107 care orders faxed to [facility]

## 2023-01-28 NOTE — PROGRESS NOTES
Shift assessment complete, alert and oriented, VSS. Ambulated to bathroom voiding , denies pain at this time. The care plan and education has been reviewed and mutually agreed upon with the patient.

## 2023-01-29 LAB
BLOOD CULTURE, ROUTINE: NORMAL
CULTURE, BLOOD 2: NORMAL

## 2023-01-30 NOTE — PROGRESS NOTES
Physician Progress Note      PATIENT:               Edil Figueroa  CSN #:                  [id number]  :                       1935  ADMIT DATE:       2023 10:43 AM  [address] [place] DATE:        2023 1:00 PM  RESPONDING  PROVIDER #:        Gloria Seip MD          QUERY TEXT:    Pt admitted with Right lower extremity cellulitis. Pt noted to have DM. If   possible, please document in progress notes and discharge summary the   relationship, if any, between cellulitis and DM. The medical record reflects the following:  Risk Factors: Right lower extremity cellulitis, DM2. Clinical Indicators: PN  -Severe bilateral extremity cellulitis  Right diabetic foot infection with wound involving the right hallux area,   Right lower extremity wounds, Longstanding type 2 diabetes mellitus. Treatment: continue IV vancomycin, consults to Infectious Disease, insulin   lispro. Options provided:  -- Right lower extremity cellulitis associated with Diabetes  -- Right lower extremity cellulitis unrelated to Diabetes  -- Other - I will add my own diagnosis  -- Disagree - Not applicable / Not valid  -- Disagree - Clinically unable to determine / Unknown  -- Refer to Clinical Documentation Reviewer    PROVIDER RESPONSE TEXT:    Right lower extremity cellulitis associated with Diabetes.     Query created by: Kip Lr on 2023 8:52 AM      Electronically signed by:  Gloria Seip MD 2023 8:57 AM

## 2023-04-28 ENCOUNTER — APPOINTMENT (OUTPATIENT)
Dept: GENERAL RADIOLOGY | Age: 88
End: 2023-04-28
Payer: MEDICARE

## 2023-04-28 ENCOUNTER — HOSPITAL ENCOUNTER (EMERGENCY)
Age: 88
Discharge: HOME | End: 2023-04-28
Payer: MEDICARE

## 2023-04-28 VITALS
BODY MASS INDEX: 32.2 KG/M2 | DIASTOLIC BLOOD PRESSURE: 72 MMHG | TEMPERATURE: 98.8 F | HEART RATE: 60 BPM | RESPIRATION RATE: 18 BRPM | OXYGEN SATURATION: 98 % | HEIGHT: 71 IN | WEIGHT: 230 LBS | SYSTOLIC BLOOD PRESSURE: 131 MMHG

## 2023-04-28 DIAGNOSIS — M25.552 LEFT HIP PAIN: Primary | ICD-10-CM

## 2023-04-28 PROCEDURE — 99283 EMERGENCY DEPT VISIT LOW MDM: CPT

## 2023-04-28 PROCEDURE — 73502 X-RAY EXAM HIP UNI 2-3 VIEWS: CPT

## 2023-04-28 RX ORDER — NAPROXEN 500 MG/1
500 TABLET ORAL 2 TIMES DAILY PRN
Qty: 20 TABLET | Refills: 0 | Status: SHIPPED | OUTPATIENT
Start: 2023-04-28 | End: 2023-04-28 | Stop reason: SDUPTHER

## 2023-04-28 RX ORDER — NAPROXEN 500 MG/1
500 TABLET ORAL 2 TIMES DAILY PRN
Qty: 20 TABLET | Refills: 0 | Status: SHIPPED | OUTPATIENT
Start: 2023-04-28

## 2023-04-28 ASSESSMENT — PAIN SCALES - GENERAL: PAINLEVEL_OUTOF10: 10

## 2023-04-28 ASSESSMENT — PAIN DESCRIPTION - LOCATION: LOCATION: HIP

## 2023-04-28 ASSESSMENT — LIFESTYLE VARIABLES: HOW OFTEN DO YOU HAVE A DRINK CONTAINING ALCOHOL: NEVER

## 2023-04-28 ASSESSMENT — PAIN - FUNCTIONAL ASSESSMENT: PAIN_FUNCTIONAL_ASSESSMENT: 0-10

## 2023-04-28 ASSESSMENT — PAIN DESCRIPTION - ORIENTATION: ORIENTATION: LEFT

## 2023-05-01 ENCOUNTER — OFFICE VISIT (OUTPATIENT)
Dept: ORTHOPEDIC SURGERY | Age: 88
End: 2023-05-01
Payer: MEDICARE

## 2023-05-01 VITALS — WEIGHT: 230 LBS | BODY MASS INDEX: 32.2 KG/M2 | HEIGHT: 71 IN

## 2023-05-01 DIAGNOSIS — M16.12 PRIMARY OSTEOARTHRITIS OF LEFT HIP: Primary | ICD-10-CM

## 2023-05-01 DIAGNOSIS — M25.552 LEFT HIP PAIN: ICD-10-CM

## 2023-05-01 PROCEDURE — 99204 OFFICE O/P NEW MOD 45 MIN: CPT

## 2023-05-01 PROCEDURE — G8427 DOCREV CUR MEDS BY ELIG CLIN: HCPCS

## 2023-05-01 PROCEDURE — 1036F TOBACCO NON-USER: CPT

## 2023-05-01 PROCEDURE — G8417 CALC BMI ABV UP PARAM F/U: HCPCS

## 2023-05-01 PROCEDURE — 1123F ACP DISCUSS/DSCN MKR DOCD: CPT

## 2023-05-01 RX ORDER — NAPROXEN 500 MG/1
500 TABLET ORAL 2 TIMES DAILY WITH MEALS
Qty: 28 TABLET | Refills: 1 | Status: SHIPPED | OUTPATIENT
Start: 2023-05-01

## 2023-05-03 ENCOUNTER — TELEPHONE (OUTPATIENT)
Dept: ORTHOPEDIC SURGERY | Age: 88
End: 2023-05-03

## 2023-05-03 NOTE — TELEPHONE ENCOUNTER
S/w Doris Hoover  regarding MRI Left Hip approval and authorization being valid until 06/02/2023. Patient was instructed that their MRI needs to be scheduled at [facility] . The patient was instructed to contact the facility to schedule  at [phone]. A follow up appointment will need to be scheduled to review the results and treatment plan. The patient has elected to contact the office at a later time to schedule a follow up appointment.

## 2023-05-12 ENCOUNTER — HOSPITAL ENCOUNTER (OUTPATIENT)
Dept: MRI IMAGING | Age: 88
Discharge: HOME | End: 2023-05-12
Payer: MEDICARE

## 2023-05-12 DIAGNOSIS — M25.552 LEFT HIP PAIN: ICD-10-CM

## 2023-05-12 DIAGNOSIS — M16.12 PRIMARY OSTEOARTHRITIS OF LEFT HIP: ICD-10-CM

## 2023-05-12 PROCEDURE — 73721 MRI JNT OF LWR EXTRE W/O DYE: CPT

## 2023-05-15 ENCOUNTER — OFFICE VISIT (OUTPATIENT)
Dept: ORTHOPEDIC SURGERY | Age: 88
End: 2023-05-15

## 2023-05-15 VITALS — BODY MASS INDEX: 32.2 KG/M2 | HEIGHT: 71 IN | RESPIRATION RATE: 12 BRPM | WEIGHT: 230 LBS

## 2023-05-15 DIAGNOSIS — M16.12 PRIMARY OSTEOARTHRITIS OF LEFT HIP: Primary | ICD-10-CM

## 2023-05-15 RX ORDER — MELOXICAM 15 MG/1
15 TABLET ORAL DAILY
Qty: 30 TABLET | Refills: 2 | Status: SHIPPED | OUTPATIENT
Start: 2023-05-15

## 2023-05-15 NOTE — PROGRESS NOTES
Chief Complaint  Hip Pain (TR MRI Left Hip )      History of Present Illness:  Elijah Gonzalez is a pleasant 82 y.o. male here to review his left hip MRI to rule out a stress fracture. He has improving pain with the naproxen and rest.  He is doing therapy prescribed by the [facility]. He was accompanied by his wife today. He is reliant on a walker. Medical History:  Patient's medications, allergies, past medical, surgical, social and family histories were reviewed and updated as appropriate. Pain Assessment  Location of Pain: Hip  Location Modifiers: Left  Severity of Pain: 5  Quality of Pain: Sharp  Duration of Pain: Persistent  Frequency of Pain: Intermittent  Aggravating Factors: Walking, Standing, Bending, Other (Comment) (Movements)  Limiting Behavior: Yes  Relieving Factors: Rest  Result of Injury: No  Work-Related Injury: No  Are there other pain locations you wish to document?: No  ROS: Review of systems reviewed from Patient History Form completed today and available in the patient's chart under the Media tab. Pertinent items are noted in HPI  Review of systems reviewed from Patient History Form completed today and available in the patient's chart under the Media tab. Vital Signs:  Resp 12   Ht 5' 11\" (1.803 m)   Wt 230 lb (104.3 kg)   BMI 32.08 kg/m²         Neuro: Alert & oriented x 3,  normal,  no focal deficits noted. Normal affect. Eyes: sclera clear  Ears: Normal external ear  Mouth:  No perioral lesions  Pulm: Respirations unlabored and regular  Pulse: Extremities well perfused. 2+ peripheral pulses. Skin: Warm. No ulcerations. Constitutional: The physical examination finds the patient to be well-developed and well-nourished. The patient is alert and oriented x3 and was cooperative throughout the visit. Hip Examination: left    Skin/Inspection: No skin lesions, cellulitis, or extreme edema in the lower extremities.      Standing/Walking: abnormal antalgic gait,

## 2023-08-02 ENCOUNTER — OFFICE VISIT (OUTPATIENT)
Dept: VASCULAR SURGERY | Age: 88
End: 2023-08-02

## 2023-08-02 VITALS — HEART RATE: 60 BPM | BODY MASS INDEX: 32.2 KG/M2 | WEIGHT: 230 LBS | HEIGHT: 71 IN

## 2023-08-02 DIAGNOSIS — I87.2 STASIS DERMATITIS OF BOTH LEGS: ICD-10-CM

## 2023-08-02 DIAGNOSIS — R60.0 BILATERAL LEG EDEMA: Primary | ICD-10-CM

## 2023-08-02 ASSESSMENT — ENCOUNTER SYMPTOMS
ALLERGIC/IMMUNOLOGIC NEGATIVE: 1
BACK PAIN: 1
EYES NEGATIVE: 1
SHORTNESS OF BREATH: 1
GASTROINTESTINAL NEGATIVE: 1

## 2023-08-02 NOTE — PROGRESS NOTES
and reactive to light. Cardiovascular:      Rate and Rhythm: Normal rate and regular rhythm. Pulses: Normal pulses. Heart sounds: Normal heart sounds. Pulmonary:      Effort: Pulmonary effort is normal.      Breath sounds: Normal breath sounds. Abdominal:      General: Bowel sounds are normal.      Palpations: Abdomen is soft. There is no mass. Hernia: No hernia is present. Musculoskeletal:      Cervical back: Normal range of motion. Right lower leg: Edema (1+ pitting) present. Left lower leg: Edema (1+ pitting) present. Skin:     General: Skin is warm and dry. Capillary Refill: Capillary refill takes less than 2 seconds. Findings: Erythema (B gaitor areas) and rash present. No lesion. Neurological:      General: No focal deficit present. Mental Status: He is alert and oriented to person, place, and time. Cranial Nerves: No cranial nerve deficit. Sensory: No sensory deficit. Motor: No weakness. Coordination: Coordination normal.      Gait: Gait normal.   Psychiatric:         Mood and Affect: Mood normal.         Behavior: Behavior normal.         Thought Content: Thought content normal.         Judgment: Judgment normal.       Assessment:      Chronic edema BLE with stasis dermatitis and recurrent cellultis  DM  HTN      Plan:      Begin compression therapy to control his edema utilizing a Unna boot today on both legs with steroid cream to control the inflammatory process. Once edema is controlled will need to be placed in knee-high 20/30 zippered custom stockings for long-term control. Stressed the timetable of this process over the next several weeks as well as the need for compliance. Both wife and  understand and will have the wraps placed today with follow-up in 1 week.

## 2023-08-08 NOTE — TELEPHONE ENCOUNTER
Medication requested: meloxicam    Last OV: 5/15/23  Last filled:5/15/23    Assessment: Patient is a 82 y.o. male with resolving left hip pain related to what I suspected was an aggravation of pre existing hip OA and resulting bursitis. No full  thickness abductor pathology. Impression:        Office Procedures:    Encounter Medications    No orders of the defined types were placed in this encounter. No orders of the defined types were placed in this encounter. Plan:  I recommend no nop management, future options for  BURSA vs joint injection. Mobic 1 month x 2 refill , to stop naproxyn. We recommend that He continuing with physical therapy at home. He requested referral to vascular surgeon for what appears to be bilateral venous stasis. We will refer to vascular surgeon at [facility]. All the patient's questions were answered while in the clinic. The patient is understanding of all instructions and agrees with the plan. Approximately 30 minutes was spent on patient education and coordinating care. Follow up in: No follow-ups on file.

## 2023-08-09 RX ORDER — MELOXICAM 15 MG/1
15 TABLET ORAL DAILY
Qty: 30 TABLET | Refills: 2 | OUTPATIENT
Start: 2023-08-09

## 2023-08-16 ENCOUNTER — OFFICE VISIT (OUTPATIENT)
Dept: VASCULAR SURGERY | Age: 88
End: 2023-08-16
Payer: MEDICARE

## 2023-08-16 VITALS — WEIGHT: 230 LBS | BODY MASS INDEX: 32.2 KG/M2 | HEART RATE: 60 BPM | HEIGHT: 71 IN

## 2023-08-16 DIAGNOSIS — R60.0 BILATERAL LEG EDEMA: Primary | ICD-10-CM

## 2023-08-16 DIAGNOSIS — I87.2 STASIS DERMATITIS OF BOTH LEGS: ICD-10-CM

## 2023-08-16 PROCEDURE — 1123F ACP DISCUSS/DSCN MKR DOCD: CPT | Performed by: SURGERY

## 2023-08-16 PROCEDURE — G8427 DOCREV CUR MEDS BY ELIG CLIN: HCPCS | Performed by: SURGERY

## 2023-08-16 PROCEDURE — G8417 CALC BMI ABV UP PARAM F/U: HCPCS | Performed by: SURGERY

## 2023-08-16 PROCEDURE — 1036F TOBACCO NON-USER: CPT | Performed by: SURGERY

## 2023-08-16 PROCEDURE — 99212 OFFICE O/P EST SF 10 MIN: CPT | Performed by: SURGERY

## 2023-08-16 NOTE — PROGRESS NOTES
RTO for f/u of compression for edema and cellulitis. Has been noncompliant o some degree - removed wraps last week before office visit. Legs feel better byu his account. EXAM:  Edema BLE controlled with resolution of all inflammation     A/P: Chronic edema BLE - controlled   Recommended long term custom 20/30 zippered KH stockings - unwilling to purchase due to price. Wish to use CircAids - voiced my disagreement with that approach. Will give prescription for [id number] [employer] 20/30 stockings to be filled at [facility] per pt/wife request.   F/U prn.

## 2023-08-16 NOTE — PROGRESS NOTES
but the majority of treatment will be compression with wraps, tight socks, Unaboot, etc.  He is responding so far well to ACE wraps. Will start low-dose torsemide today to help out with this. Summary of Assessment and Plan:  8/22/23    Carotid US soon   Medication changes today - Discontinue Losartan, Start Imdur 30mg daily, Start Nitro PRN, Start Torsemide 10mg daily - scripts sent to verified pharmacy. Continue risk factor modifications   Call for any new or worsening symptoms. All new cardiac testing and lab results personally reviewed by me during this office visit and discussed with patient. Patient counseled on lifestyle modification, diet, and exercise. Follow Up: 1 month     Vianey Gonzalez MD  Cardiologist [address] [facility]    Scribe Attestation: This note was scribed in the presence of Dr. Mariah Eaton by Ten Gonzalez RN. Physician Attestation  The scribe wrote this note in the presence of me Vianey Gonzalez MD). The scribe may have prepopulated components of this note with my historical  intellectual property under my direct supervision. Any additions to this intellectual property were performed in my presence and at my direction. Furthermore, the content and accuracy of this note have been reviewed by me with edits by me as needed. Vianey Gonzalez MD 8/22/2023 12:50 PM    Time spent: 69 minutes; 37 minutes face-to-face with the patient interviewing and examining and providing counseling/education about various medical issues, remainder pre/post chart preparation time including reviewing recent progress notes, diagnostic studies, and laboratory results, as well as placing new orders and coordinating the patient's care.

## 2023-08-17 PROBLEM — R06.02 SOB (SHORTNESS OF BREATH): Status: ACTIVE | Noted: 2023-08-17

## 2023-08-22 ENCOUNTER — TELEPHONE (OUTPATIENT)
Dept: VASCULAR SURGERY | Age: 88
End: 2023-08-22

## 2023-08-22 ENCOUNTER — OFFICE VISIT (OUTPATIENT)
Dept: CARDIOLOGY CLINIC | Age: 88
End: 2023-08-22
Payer: MEDICARE

## 2023-08-22 VITALS
HEART RATE: 72 BPM | BODY MASS INDEX: 31.79 KG/M2 | SYSTOLIC BLOOD PRESSURE: 104 MMHG | HEIGHT: 71 IN | WEIGHT: 227.1 LBS | OXYGEN SATURATION: 97 % | DIASTOLIC BLOOD PRESSURE: 58 MMHG

## 2023-08-22 DIAGNOSIS — I35.8 AORTIC VALVE SCLEROSIS: ICD-10-CM

## 2023-08-22 DIAGNOSIS — R60.0 BILATERAL LOWER EXTREMITY EDEMA: ICD-10-CM

## 2023-08-22 DIAGNOSIS — I50.32 CHRONIC DIASTOLIC HEART FAILURE (HCC): ICD-10-CM

## 2023-08-22 DIAGNOSIS — R09.89 BILATERAL CAROTID BRUITS: ICD-10-CM

## 2023-08-22 DIAGNOSIS — Z86.73 OLD CEREBROVASCULAR ACCIDENT (CVA) WITHOUT LATE EFFECT: ICD-10-CM

## 2023-08-22 DIAGNOSIS — R06.02 SOB (SHORTNESS OF BREATH): Primary | ICD-10-CM

## 2023-08-22 DIAGNOSIS — I65.29 STENOSIS OF CAROTID ARTERY, UNSPECIFIED LATERALITY: ICD-10-CM

## 2023-08-22 DIAGNOSIS — E78.5 HYPERLIPIDEMIA, UNSPECIFIED HYPERLIPIDEMIA TYPE: ICD-10-CM

## 2023-08-22 DIAGNOSIS — E66.9 OBESITY (BMI 30-39.9): ICD-10-CM

## 2023-08-22 DIAGNOSIS — E11.9 TYPE 2 DIABETES MELLITUS WITHOUT COMPLICATION, WITHOUT LONG-TERM CURRENT USE OF INSULIN (HCC): ICD-10-CM

## 2023-08-22 DIAGNOSIS — Z86.711 HISTORY OF PULMONARY EMBOLISM: ICD-10-CM

## 2023-08-22 DIAGNOSIS — I10 ESSENTIAL HYPERTENSION: ICD-10-CM

## 2023-08-22 PROCEDURE — 1036F TOBACCO NON-USER: CPT | Performed by: INTERNAL MEDICINE

## 2023-08-22 PROCEDURE — G8427 DOCREV CUR MEDS BY ELIG CLIN: HCPCS | Performed by: INTERNAL MEDICINE

## 2023-08-22 PROCEDURE — 93000 ELECTROCARDIOGRAM COMPLETE: CPT | Performed by: INTERNAL MEDICINE

## 2023-08-22 PROCEDURE — 1123F ACP DISCUSS/DSCN MKR DOCD: CPT | Performed by: INTERNAL MEDICINE

## 2023-08-22 PROCEDURE — 99205 OFFICE O/P NEW HI 60 MIN: CPT | Performed by: INTERNAL MEDICINE

## 2023-08-22 PROCEDURE — G8417 CALC BMI ABV UP PARAM F/U: HCPCS | Performed by: INTERNAL MEDICINE

## 2023-08-22 RX ORDER — TORSEMIDE 10 MG/1
10 TABLET ORAL DAILY
Qty: 90 TABLET | Refills: 3 | Status: SHIPPED | OUTPATIENT
Start: 2023-08-22

## 2023-08-22 RX ORDER — ISOSORBIDE MONONITRATE 30 MG/1
30 TABLET, EXTENDED RELEASE ORAL DAILY
Qty: 90 TABLET | Refills: 3 | Status: SHIPPED | OUTPATIENT
Start: 2023-08-22

## 2023-08-22 RX ORDER — NITROGLYCERIN 0.4 MG/1
0.4 TABLET SUBLINGUAL EVERY 5 MIN PRN
Qty: 25 TABLET | Refills: 3 | Status: SHIPPED | OUTPATIENT
Start: 2023-08-22

## 2023-08-22 NOTE — TELEPHONE ENCOUNTER
Spoke with spouse. Unna boots were removed last night prior to cardiology appointment this morning. Also states that they are not interested in the custom zippered compression stockings as discussed in the office. They have found a different pair of more affordable zippered compression that they will try. Patient will call the office if any future needs or concerns. Until that time they will take the recommendation of cardiology and the [place].

## 2023-08-22 NOTE — PATIENT INSTRUCTIONS
Follow up with Dr Tawana Geller in 1 month     Discontinue Losartan  Start Imdur 30mg daily   Start Torsemide 10mg daily   Start Nitro for acute chest pain  Carotid US soon     Call the [place] and have his labs faxed to our office as soon as possible. [facility] lab is one he would specifically want but anything since January       Call for any questions or concerns.

## 2023-09-01 ENCOUNTER — HOSPITAL ENCOUNTER (OUTPATIENT)
Dept: VASCULAR LAB | Age: 88
Discharge: HOME | End: 2023-09-01
Payer: MEDICARE

## 2023-09-01 DIAGNOSIS — I65.29 STENOSIS OF CAROTID ARTERY, UNSPECIFIED LATERALITY: ICD-10-CM

## 2023-09-01 DIAGNOSIS — R09.89 BILATERAL CAROTID BRUITS: ICD-10-CM

## 2023-09-01 DIAGNOSIS — Z86.73 OLD CEREBROVASCULAR ACCIDENT (CVA) WITHOUT LATE EFFECT: ICD-10-CM

## 2023-09-01 PROCEDURE — 93880 EXTRACRANIAL BILAT STUDY: CPT

## 2023-09-18 ENCOUNTER — APPOINTMENT (OUTPATIENT)
Dept: CT IMAGING | Age: 88
DRG: 394 | End: 2023-09-18
Payer: MEDICARE

## 2023-09-18 ENCOUNTER — HOSPITAL ENCOUNTER (INPATIENT)
Age: 88
LOS: 2 days | Discharge: HOME | DRG: 394 | End: 2023-09-20
Attending: INTERNAL MEDICINE | Admitting: INTERNAL MEDICINE
Payer: MEDICARE

## 2023-09-18 DIAGNOSIS — R33.9 RETENTION OF URINE: ICD-10-CM

## 2023-09-18 DIAGNOSIS — E87.6 HYPOKALEMIA: ICD-10-CM

## 2023-09-18 DIAGNOSIS — K62.5 RECTAL BLEED: Primary | ICD-10-CM

## 2023-09-18 PROBLEM — K56.41 FECAL IMPACTION (HCC): Status: ACTIVE | Noted: 2023-09-18

## 2023-09-18 PROBLEM — R63.4 WEIGHT LOSS: Status: ACTIVE | Noted: 2023-09-18

## 2023-09-18 PROBLEM — K59.04 CHRONIC IDIOPATHIC CONSTIPATION: Status: ACTIVE | Noted: 2023-09-18

## 2023-09-18 PROBLEM — N32.0 BLADDER OUTLET OBSTRUCTION: Status: ACTIVE | Noted: 2023-09-18

## 2023-09-18 PROBLEM — E78.00 PURE HYPERCHOLESTEROLEMIA: Status: ACTIVE | Noted: 2023-09-18

## 2023-09-18 PROBLEM — N13.30 HYDRONEPHROSIS: Status: ACTIVE | Noted: 2023-09-18

## 2023-09-18 PROBLEM — I25.10 ASHD (ARTERIOSCLEROTIC HEART DISEASE): Status: ACTIVE | Noted: 2023-09-18

## 2023-09-18 LAB
ALBUMIN SERPL-MCNC: 4.3 G/DL (ref 3.4–5)
ALBUMIN/GLOB SERPL: 1.2 {RATIO} (ref 1.1–2.2)
ALP SERPL-CCNC: 49 U/L (ref 40–129)
ALT SERPL-CCNC: 23 U/L (ref 10–40)
ANION GAP SERPL CALCULATED.3IONS-SCNC: 14 MMOL/L (ref 3–16)
AST SERPL-CCNC: 23 U/L (ref 15–37)
BACTERIA URNS QL MICRO: ABNORMAL /HPF
BASOPHILS # BLD: 0.1 K/UL (ref 0–0.2)
BASOPHILS NFR BLD: 1 %
BILIRUB SERPL-MCNC: 0.7 MG/DL (ref 0–1)
BILIRUB UR QL STRIP.AUTO: NEGATIVE
BUN SERPL-MCNC: 21 MG/DL (ref 7–20)
CALCIUM SERPL-MCNC: 10.4 MG/DL (ref 8.3–10.6)
CHLORIDE SERPL-SCNC: 93 MMOL/L (ref 99–110)
CLARITY UR: CLEAR
CO2 SERPL-SCNC: 29 MMOL/L (ref 21–32)
COLOR UR: YELLOW
CREAT SERPL-MCNC: 1 MG/DL (ref 0.8–1.3)
DEPRECATED RDW RBC AUTO: 13.3 % (ref 12.4–15.4)
EOSINOPHIL # BLD: 0.1 K/UL (ref 0–0.6)
EOSINOPHIL NFR BLD: 1 %
EPI CELLS #/AREA URNS AUTO: 0 /HPF (ref 0–5)
GFR SERPLBLD CREATININE-BSD FMLA CKD-EPI: >60 ML/MIN/{1.73_M2}
GLUCOSE SERPL-MCNC: 150 MG/DL (ref 70–99)
GLUCOSE UR STRIP.AUTO-MCNC: NEGATIVE MG/DL
HCT VFR BLD AUTO: 43 % (ref 40.5–52.5)
HGB BLD-MCNC: 15.1 G/DL (ref 13.5–17.5)
HGB UR QL STRIP.AUTO: ABNORMAL
HYALINE CASTS #/AREA URNS AUTO: 1 /LPF (ref 0–8)
KETONES UR STRIP.AUTO-MCNC: NEGATIVE MG/DL
LEUKOCYTE ESTERASE UR QL STRIP.AUTO: NEGATIVE
LYMPHOCYTES # BLD: 2 K/UL (ref 1–5.1)
LYMPHOCYTES NFR BLD: 19.5 %
MCH RBC QN AUTO: 32.8 PG (ref 26–34)
MCHC RBC AUTO-ENTMCNC: 35.1 G/DL (ref 31–36)
MCV RBC AUTO: 93.6 FL (ref 80–100)
MONOCYTES # BLD: 1.4 K/UL (ref 0–1.3)
MONOCYTES NFR BLD: 14.3 %
NEUTROPHILS # BLD: 6.5 K/UL (ref 1.7–7.7)
NEUTROPHILS NFR BLD: 64.2 %
NITRITE UR QL STRIP.AUTO: NEGATIVE
PH UR STRIP.AUTO: 8 [PH] (ref 5–8)
PLATELET # BLD AUTO: 273 K/UL (ref 135–450)
PMV BLD AUTO: 8.4 FL (ref 5–10.5)
POTASSIUM SERPL-SCNC: 2.4 MMOL/L (ref 3.5–5.1)
PROT SERPL-MCNC: 8 G/DL (ref 6.4–8.2)
PROT UR STRIP.AUTO-MCNC: 100 MG/DL
RBC # BLD AUTO: 4.59 M/UL (ref 4.2–5.9)
RBC CLUMPS #/AREA URNS AUTO: 15 /HPF (ref 0–4)
SODIUM SERPL-SCNC: 136 MMOL/L (ref 136–145)
SP GR UR STRIP.AUTO: 1.02 (ref 1–1.03)
UA COMPLETE W REFLEX CULTURE PNL UR: ABNORMAL
UA DIPSTICK W REFLEX MICRO PNL UR: YES
URN SPEC COLLECT METH UR: ABNORMAL
UROBILINOGEN UR STRIP-ACNC: 0.2 E.U./DL
WBC # BLD AUTO: 10.1 K/UL (ref 4–11)
WBC #/AREA URNS AUTO: 1 /HPF (ref 0–5)

## 2023-09-18 PROCEDURE — 51702 INSERT TEMP BLADDER CATH: CPT

## 2023-09-18 PROCEDURE — 6370000000 HC RX 637 (ALT 250 FOR IP): Performed by: PHYSICIAN ASSISTANT

## 2023-09-18 PROCEDURE — 74174 CTA ABD&PLVS W/CONTRAST: CPT

## 2023-09-18 PROCEDURE — 6370000000 HC RX 637 (ALT 250 FOR IP): Performed by: INTERNAL MEDICINE

## 2023-09-18 PROCEDURE — 6360000004 HC RX CONTRAST MEDICATION: Performed by: PHYSICIAN ASSISTANT

## 2023-09-18 PROCEDURE — 99285 EMERGENCY DEPT VISIT HI MDM: CPT

## 2023-09-18 PROCEDURE — 80053 COMPREHEN METABOLIC PANEL: CPT

## 2023-09-18 PROCEDURE — 1200000000 HC SEMI PRIVATE

## 2023-09-18 PROCEDURE — 2580000003 HC RX 258: Performed by: INTERNAL MEDICINE

## 2023-09-18 PROCEDURE — 6360000002 HC RX W HCPCS: Performed by: PHYSICIAN ASSISTANT

## 2023-09-18 PROCEDURE — 85025 COMPLETE CBC W/AUTO DIFF WBC: CPT

## 2023-09-18 PROCEDURE — 81001 URINALYSIS AUTO W/SCOPE: CPT

## 2023-09-18 RX ORDER — ENEMA 19; 7 G/133ML; G/133ML
1 ENEMA RECTAL ONCE
Status: COMPLETED | OUTPATIENT
Start: 2023-09-19 | End: 2023-09-19

## 2023-09-18 RX ORDER — TORSEMIDE 20 MG/1
10 TABLET ORAL DAILY
Status: DISCONTINUED | OUTPATIENT
Start: 2023-09-19 | End: 2023-09-20

## 2023-09-18 RX ORDER — OXYCODONE HYDROCHLORIDE AND ACETAMINOPHEN 5; 325 MG/1; MG/1
1 TABLET ORAL ONCE
Status: COMPLETED | OUTPATIENT
Start: 2023-09-18 | End: 2023-09-18

## 2023-09-18 RX ORDER — ISOSORBIDE MONONITRATE 30 MG/1
30 TABLET, EXTENDED RELEASE ORAL DAILY
Status: DISCONTINUED | OUTPATIENT
Start: 2023-09-19 | End: 2023-09-20 | Stop reason: HOSPADM

## 2023-09-18 RX ORDER — ASPIRIN 81 MG/1
81 TABLET, CHEWABLE ORAL DAILY
Status: DISCONTINUED | OUTPATIENT
Start: 2023-09-19 | End: 2023-09-20 | Stop reason: HOSPADM

## 2023-09-18 RX ORDER — ATENOLOL AND CHLORTHALIDONE TABLET 100; 25 MG/1; MG/1
0.5 TABLET ORAL DAILY
COMMUNITY
End: 2023-09-24 | Stop reason: SDUPTHER

## 2023-09-18 RX ORDER — POTASSIUM CHLORIDE 7.45 MG/ML
10 INJECTION INTRAVENOUS
Status: COMPLETED | OUTPATIENT
Start: 2023-09-18 | End: 2023-09-18

## 2023-09-18 RX ORDER — NITROGLYCERIN 0.4 MG/1
0.4 TABLET SUBLINGUAL EVERY 5 MIN PRN
Status: DISCONTINUED | OUTPATIENT
Start: 2023-09-18 | End: 2023-09-20 | Stop reason: HOSPADM

## 2023-09-18 RX ORDER — ATENOLOL 25 MG/1
25 TABLET ORAL DAILY
Status: DISCONTINUED | OUTPATIENT
Start: 2023-09-19 | End: 2023-09-18 | Stop reason: ALTCHOICE

## 2023-09-18 RX ORDER — SODIUM CHLORIDE, SODIUM LACTATE, POTASSIUM CHLORIDE, CALCIUM CHLORIDE 600; 310; 30; 20 MG/100ML; MG/100ML; MG/100ML; MG/100ML
INJECTION, SOLUTION INTRAVENOUS CONTINUOUS
Status: DISCONTINUED | OUTPATIENT
Start: 2023-09-18 | End: 2023-09-20

## 2023-09-18 RX ORDER — CIPROFLOXACIN 500 MG/1
500 TABLET, FILM COATED ORAL EVERY 12 HOURS SCHEDULED
Status: DISCONTINUED | OUTPATIENT
Start: 2023-09-18 | End: 2023-09-20 | Stop reason: HOSPADM

## 2023-09-18 RX ORDER — ENEMA 19; 7 G/133ML; G/133ML
1 ENEMA RECTAL ONCE
Status: DISCONTINUED | OUTPATIENT
Start: 2023-09-19 | End: 2023-09-18 | Stop reason: SDUPTHER

## 2023-09-18 RX ORDER — ATORVASTATIN CALCIUM 80 MG/1
80 TABLET, FILM COATED ORAL DAILY
Status: DISCONTINUED | OUTPATIENT
Start: 2023-09-19 | End: 2023-09-20 | Stop reason: HOSPADM

## 2023-09-18 RX ADMIN — POTASSIUM BICARBONATE 50 MEQ: 978 TABLET, EFFERVESCENT ORAL at 14:07

## 2023-09-18 RX ADMIN — IOPAMIDOL 75 ML: 755 INJECTION, SOLUTION INTRAVENOUS at 13:22

## 2023-09-18 RX ADMIN — CIPROFLOXACIN 500 MG: 500 TABLET, FILM COATED ORAL at 21:48

## 2023-09-18 RX ADMIN — POTASSIUM CHLORIDE 10 MEQ: 7.46 INJECTION, SOLUTION INTRAVENOUS at 17:40

## 2023-09-18 RX ADMIN — POTASSIUM CHLORIDE 10 MEQ: 7.46 INJECTION, SOLUTION INTRAVENOUS at 16:40

## 2023-09-18 RX ADMIN — SODIUM CHLORIDE, POTASSIUM CHLORIDE, SODIUM LACTATE AND CALCIUM CHLORIDE: 600; 310; 30; 20 INJECTION, SOLUTION INTRAVENOUS at 16:46

## 2023-09-18 RX ADMIN — POTASSIUM BICARBONATE 50 MEQ: 978 TABLET, EFFERVESCENT ORAL at 21:48

## 2023-09-18 RX ADMIN — OXYCODONE HYDROCHLORIDE AND ACETAMINOPHEN 1 TABLET: 5; 325 TABLET ORAL at 13:03

## 2023-09-18 ASSESSMENT — ENCOUNTER SYMPTOMS
NAUSEA: 0
SHORTNESS OF BREATH: 0
COUGH: 0
RHINORRHEA: 0
WHEEZING: 0
DIARRHEA: 0
ANAL BLEEDING: 1
VOMITING: 0
ABDOMINAL PAIN: 0

## 2023-09-18 ASSESSMENT — PAIN SCALES - GENERAL
PAINLEVEL_OUTOF10: 7
PAINLEVEL_OUTOF10: 3

## 2023-09-18 ASSESSMENT — PAIN DESCRIPTION - LOCATION: LOCATION: ABDOMEN

## 2023-09-18 ASSESSMENT — PAIN DESCRIPTION - DESCRIPTORS: DESCRIPTORS: ACHING

## 2023-09-18 ASSESSMENT — LIFESTYLE VARIABLES: HOW OFTEN DO YOU HAVE A DRINK CONTAINING ALCOHOL: NEVER

## 2023-09-18 NOTE — ED PROVIDER NOTES
urinalysis ordered. He will be admitted for further evaluation management of hypokalemia and GI consultation. I spoke with the admitting physician, Dr. Qing Thacker, who will resume care the patient at this time. Patient was informed and agreeable. He is stable for admission. I am the Primary Clinician of Record. FINAL IMPRESSION      1. Rectal bleed    2. Hypokalemia          DISPOSITION/PLAN     DISPOSITION Decision To Admit 09/18/2023 02:56:43 PM      PATIENT REFERRED TO:  No follow-up provider specified.     DISCHARGE MEDICATIONS:  New Prescriptions    No medications on file       DISCONTINUED MEDICATIONS:  Discontinued Medications    No medications on file              (Please note that portions of this note were completed with a voice recognition program.  Efforts were made to edit the dictations but occasionally words are mis-transcribed.)    Dillon Bradley PA-C (electronically signed)           Andrey Kimbrough PA-C  09/18/23 1502

## 2023-09-19 ENCOUNTER — ANESTHESIA EVENT (OUTPATIENT)
Dept: ENDOSCOPY | Age: 88
DRG: 394 | End: 2023-09-19
Payer: MEDICARE

## 2023-09-19 ENCOUNTER — ANESTHESIA (OUTPATIENT)
Dept: ENDOSCOPY | Age: 88
DRG: 394 | End: 2023-09-19
Payer: MEDICARE

## 2023-09-19 LAB
ANION GAP SERPL CALCULATED.3IONS-SCNC: 12 MMOL/L (ref 3–16)
BUN SERPL-MCNC: 17 MG/DL (ref 7–20)
CALCIUM SERPL-MCNC: 9.3 MG/DL (ref 8.3–10.6)
CHLORIDE SERPL-SCNC: 96 MMOL/L (ref 99–110)
CO2 SERPL-SCNC: 31 MMOL/L (ref 21–32)
CREAT SERPL-MCNC: 0.8 MG/DL (ref 0.8–1.3)
GFR SERPLBLD CREATININE-BSD FMLA CKD-EPI: >60 ML/MIN/{1.73_M2}
GLUCOSE BLD-MCNC: 130 MG/DL (ref 70–99)
GLUCOSE BLD-MCNC: 150 MG/DL (ref 70–99)
GLUCOSE SERPL-MCNC: 128 MG/DL (ref 70–99)
HCT VFR BLD AUTO: 38.9 % (ref 40.5–52.5)
HCT VFR BLD AUTO: 39.2 % (ref 40.5–52.5)
HCT VFR BLD AUTO: 40 % (ref 40.5–52.5)
HCT VFR BLD AUTO: 40.5 % (ref 40.5–52.5)
HGB BLD-MCNC: 13.2 G/DL (ref 13.5–17.5)
HGB BLD-MCNC: 13.4 G/DL (ref 13.5–17.5)
HGB BLD-MCNC: 13.9 G/DL (ref 13.5–17.5)
HGB BLD-MCNC: 14 G/DL (ref 13.5–17.5)
PERFORMED ON: ABNORMAL
PERFORMED ON: ABNORMAL
POTASSIUM SERPL-SCNC: 2.5 MMOL/L (ref 3.5–5.1)
POTASSIUM SERPL-SCNC: 3.3 MMOL/L (ref 3.5–5.1)
PSA SERPL DL<=0.01 NG/ML-MCNC: 0.1 NG/ML (ref 0–4)
SODIUM SERPL-SCNC: 139 MMOL/L (ref 136–145)

## 2023-09-19 PROCEDURE — 6370000000 HC RX 637 (ALT 250 FOR IP): Performed by: INTERNAL MEDICINE

## 2023-09-19 PROCEDURE — 6360000002 HC RX W HCPCS: Performed by: INTERNAL MEDICINE

## 2023-09-19 PROCEDURE — 3609008400 HC SIGMOIDOSCOPY DIAGNOSTIC: Performed by: INTERNAL MEDICINE

## 2023-09-19 PROCEDURE — 6360000002 HC RX W HCPCS: Performed by: NURSE ANESTHETIST, CERTIFIED REGISTERED

## 2023-09-19 PROCEDURE — 2580000003 HC RX 258: Performed by: NURSE ANESTHETIST, CERTIFIED REGISTERED

## 2023-09-19 PROCEDURE — 2580000003 HC RX 258: Performed by: INTERNAL MEDICINE

## 2023-09-19 PROCEDURE — 2709999900 HC NON-CHARGEABLE SUPPLY: Performed by: INTERNAL MEDICINE

## 2023-09-19 PROCEDURE — 6370000000 HC RX 637 (ALT 250 FOR IP): Performed by: NURSE PRACTITIONER

## 2023-09-19 PROCEDURE — 84132 ASSAY OF SERUM POTASSIUM: CPT

## 2023-09-19 PROCEDURE — 7100000001 HC PACU RECOVERY - ADDTL 15 MIN: Performed by: INTERNAL MEDICINE

## 2023-09-19 PROCEDURE — 3700000000 HC ANESTHESIA ATTENDED CARE: Performed by: INTERNAL MEDICINE

## 2023-09-19 PROCEDURE — 7100000000 HC PACU RECOVERY - FIRST 15 MIN: Performed by: INTERNAL MEDICINE

## 2023-09-19 PROCEDURE — 84153 ASSAY OF PSA TOTAL: CPT

## 2023-09-19 PROCEDURE — 2500000003 HC RX 250 WO HCPCS: Performed by: NURSE ANESTHETIST, CERTIFIED REGISTERED

## 2023-09-19 PROCEDURE — 1200000000 HC SEMI PRIVATE

## 2023-09-19 PROCEDURE — 0DJD8ZZ INSPECTION OF LOWER INTESTINAL TRACT, VIA NATURAL OR ARTIFICIAL OPENING ENDOSCOPIC: ICD-10-PCS | Performed by: INTERNAL MEDICINE

## 2023-09-19 PROCEDURE — 36415 COLL VENOUS BLD VENIPUNCTURE: CPT

## 2023-09-19 PROCEDURE — 51702 INSERT TEMP BLADDER CATH: CPT

## 2023-09-19 PROCEDURE — 3700000001 HC ADD 15 MINUTES (ANESTHESIA): Performed by: INTERNAL MEDICINE

## 2023-09-19 PROCEDURE — 85014 HEMATOCRIT: CPT

## 2023-09-19 PROCEDURE — 80048 BASIC METABOLIC PNL TOTAL CA: CPT

## 2023-09-19 PROCEDURE — 85018 HEMOGLOBIN: CPT

## 2023-09-19 RX ORDER — POTASSIUM CHLORIDE 7.45 MG/ML
10 INJECTION INTRAVENOUS PRN
Status: DISCONTINUED | OUTPATIENT
Start: 2023-09-19 | End: 2023-09-20

## 2023-09-19 RX ORDER — POTASSIUM CHLORIDE 20 MEQ/1
40 TABLET, EXTENDED RELEASE ORAL PRN
Status: DISCONTINUED | OUTPATIENT
Start: 2023-09-19 | End: 2023-09-20

## 2023-09-19 RX ORDER — SODIUM CHLORIDE 9 MG/ML
INJECTION, SOLUTION INTRAVENOUS CONTINUOUS PRN
Status: DISCONTINUED | OUTPATIENT
Start: 2023-09-19 | End: 2023-09-19 | Stop reason: SDUPTHER

## 2023-09-19 RX ORDER — SODIUM CHLORIDE 9 MG/ML
INJECTION, SOLUTION INTRAVENOUS CONTINUOUS
Status: DISCONTINUED | OUTPATIENT
Start: 2023-09-19 | End: 2023-09-19 | Stop reason: HOSPADM

## 2023-09-19 RX ORDER — TAMSULOSIN HYDROCHLORIDE 0.4 MG/1
0.4 CAPSULE ORAL DAILY
Status: DISCONTINUED | OUTPATIENT
Start: 2023-09-19 | End: 2023-09-20 | Stop reason: HOSPADM

## 2023-09-19 RX ORDER — PROPOFOL 10 MG/ML
INJECTION, EMULSION INTRAVENOUS CONTINUOUS PRN
Status: DISCONTINUED | OUTPATIENT
Start: 2023-09-19 | End: 2023-09-19 | Stop reason: SDUPTHER

## 2023-09-19 RX ORDER — POLYETHYLENE GLYCOL 3350 17 G/17G
17 POWDER, FOR SOLUTION ORAL 2 TIMES DAILY
Status: DISCONTINUED | OUTPATIENT
Start: 2023-09-19 | End: 2023-09-20 | Stop reason: SDUPTHER

## 2023-09-19 RX ORDER — ENEMA 19; 7 G/133ML; G/133ML
1 ENEMA RECTAL ONCE
Status: DISCONTINUED | OUTPATIENT
Start: 2023-09-19 | End: 2023-09-19

## 2023-09-19 RX ORDER — LIDOCAINE HYDROCHLORIDE 20 MG/ML
INJECTION, SOLUTION EPIDURAL; INFILTRATION; INTRACAUDAL; PERINEURAL PRN
Status: DISCONTINUED | OUTPATIENT
Start: 2023-09-19 | End: 2023-09-19 | Stop reason: SDUPTHER

## 2023-09-19 RX ORDER — EPHEDRINE SULFATE/0.9% NACL/PF 50 MG/5 ML
SYRINGE (ML) INTRAVENOUS PRN
Status: DISCONTINUED | OUTPATIENT
Start: 2023-09-19 | End: 2023-09-19 | Stop reason: SDUPTHER

## 2023-09-19 RX ORDER — FINASTERIDE 5 MG/1
5 TABLET, FILM COATED ORAL DAILY
Status: DISCONTINUED | OUTPATIENT
Start: 2023-09-19 | End: 2023-09-20 | Stop reason: HOSPADM

## 2023-09-19 RX ADMIN — PROPOFOL 100 MCG/KG/MIN: 10 INJECTION, EMULSION INTRAVENOUS at 15:41

## 2023-09-19 RX ADMIN — POTASSIUM CHLORIDE 10 MEQ: 7.46 INJECTION, SOLUTION INTRAVENOUS at 09:06

## 2023-09-19 RX ADMIN — ISOSORBIDE MONONITRATE 30 MG: 30 TABLET, EXTENDED RELEASE ORAL at 08:10

## 2023-09-19 RX ADMIN — LIDOCAINE HYDROCHLORIDE 100 MG: 20 INJECTION, SOLUTION EPIDURAL; INFILTRATION; INTRACAUDAL; PERINEURAL at 15:41

## 2023-09-19 RX ADMIN — CIPROFLOXACIN 500 MG: 500 TABLET, FILM COATED ORAL at 22:07

## 2023-09-19 RX ADMIN — POTASSIUM CHLORIDE 10 MEQ: 7.46 INJECTION, SOLUTION INTRAVENOUS at 11:11

## 2023-09-19 RX ADMIN — POTASSIUM CHLORIDE 10 MEQ: 7.46 INJECTION, SOLUTION INTRAVENOUS at 06:53

## 2023-09-19 RX ADMIN — METFORMIN HYDROCHLORIDE 500 MG: 500 TABLET ORAL at 08:10

## 2023-09-19 RX ADMIN — TAMSULOSIN HYDROCHLORIDE 0.4 MG: 0.4 CAPSULE ORAL at 09:44

## 2023-09-19 RX ADMIN — TORSEMIDE 10 MG: 20 TABLET ORAL at 08:10

## 2023-09-19 RX ADMIN — SODIUM PHOSPHATE 1 ENEMA: 7; 19 ENEMA RECTAL at 14:45

## 2023-09-19 RX ADMIN — SODIUM CHLORIDE: 9 INJECTION, SOLUTION INTRAVENOUS at 15:27

## 2023-09-19 RX ADMIN — Medication 10 MG: at 15:52

## 2023-09-19 RX ADMIN — SODIUM CHLORIDE: 9 INJECTION, SOLUTION INTRAVENOUS at 15:35

## 2023-09-19 RX ADMIN — CIPROFLOXACIN 500 MG: 500 TABLET, FILM COATED ORAL at 08:14

## 2023-09-19 RX ADMIN — POTASSIUM CHLORIDE 10 MEQ: 7.46 INJECTION, SOLUTION INTRAVENOUS at 08:10

## 2023-09-19 RX ADMIN — ASPIRIN 81 MG: 81 TABLET, CHEWABLE ORAL at 08:10

## 2023-09-19 RX ADMIN — Medication 10 MG: at 15:46

## 2023-09-19 RX ADMIN — FINASTERIDE 5 MG: 5 TABLET, FILM COATED ORAL at 09:44

## 2023-09-19 RX ADMIN — PROPOFOL 50 MG: 10 INJECTION, EMULSION INTRAVENOUS at 15:42

## 2023-09-19 RX ADMIN — POTASSIUM CHLORIDE 10 MEQ: 7.46 INJECTION, SOLUTION INTRAVENOUS at 12:13

## 2023-09-19 RX ADMIN — SODIUM PHOSPHATE, DIBASIC AND SODIUM PHOSPHATE, MONOBASIC 1 ENEMA: 7; 19 ENEMA RECTAL at 15:15

## 2023-09-19 RX ADMIN — ATORVASTATIN CALCIUM 80 MG: 80 TABLET, FILM COATED ORAL at 08:10

## 2023-09-19 RX ADMIN — SODIUM CHLORIDE, POTASSIUM CHLORIDE, SODIUM LACTATE AND CALCIUM CHLORIDE: 600; 310; 30; 20 INJECTION, SOLUTION INTRAVENOUS at 02:12

## 2023-09-19 RX ADMIN — POTASSIUM CHLORIDE 10 MEQ: 7.46 INJECTION, SOLUTION INTRAVENOUS at 10:01

## 2023-09-19 RX ADMIN — POTASSIUM CHLORIDE 40 MEQ: 1500 TABLET, EXTENDED RELEASE ORAL at 19:30

## 2023-09-19 RX ADMIN — POTASSIUM BICARBONATE 50 MEQ: 978 TABLET, EFFERVESCENT ORAL at 22:05

## 2023-09-19 ASSESSMENT — PAIN SCALES - GENERAL
PAINLEVEL_OUTOF10: 0
PAINLEVEL_OUTOF10: 0
PAINLEVEL_OUTOF10: 4
PAINLEVEL_OUTOF10: 0
PAINLEVEL_OUTOF10: 7

## 2023-09-19 ASSESSMENT — PAIN - FUNCTIONAL ASSESSMENT: PAIN_FUNCTIONAL_ASSESSMENT: 0-10

## 2023-09-19 NOTE — CONSULTS
Consult Note     Patient: Candy Villanueva MRN: [id number]   YOB: 1935 Age: 82 y.o. Sex: male   Unit: [address] [place] [address] EMERGENCY DEPT Room/Bed: ED-0010/10 Location: [address] [place] [address]    Admitting Physician: Dean Stafford    Primary Care Physician: Paolo Centeno MD   Admission Date: 9/18/2023   Consult Date: 9/18/2023     Assessment/Plan:  Principal Problem:    Rectal bleeding  Resolved Problems:    * No resolved hospital problems. *       Assessment  Rectal bleeding. Sounds like int hemorrhoids. No abd pain, anemia, negative CTA. Ddx diverticular bleeding. Plan:  NPO after MN except sips and meds  Start miralax 2x per day  Enemas in the morning and flex sig. Subjective:    History of Present Illness: Candy Villanueva is a 82 y.o. male who is seen in consultation at the request of Dean Stafford for rectal bleeding. Yesica Guidry has history DMII, PE, HTN, CVA, CHF, on asa only. He presents with painless rectal bleeding. He reports that he has been having some constipation recently, which is not unusual.  This morning, he put on a glove and tried to digitally disimpact himself, which triggered a significant amount of stool and red blood, prompting him to come to the ER. He also feels that he has trouble emptying his bladder. -CTA AP ok  -K 2.4, cr 1. LFT and cbc wnl. Hgb 15  -Last colonoscopy was \"a long time ago\", and he does not recall any details other than the fact that he does not want to have another colonoscopy now. -Echo 01/26/2023: EF 55-60%. Grade 1 diastolic dysfunction. No significant valve disease    Review of Systems:    Constitutional: Negative for fever, fatigue and unexpected weight change. HENT: Negative for trouble swallowing. Respiratory: Negative for cough, chest tightness and shortness of breath. Cardiovascular: Negative for chest pain and leg swelling.    Gastrointestinal: see hpi  Musculoskeletal: Negative for unusual back pain and
! 0.81! +---------------+----+----+-----+----+ ! Prox Subclavian! 118 !    !60   !    ! +---------------+----+----+-----+----+   - There is antegrade vertebral flow noted on the right side. - Additional Measurements:ICAPSV/CCAPSV 9.47. ICAEDV/CCAEDV 9.53. Carotid Left Measurements +---------------+----+----+-----+----+ ! Location       ! PSV ! EDV ! Angle! RI  ! +---------------+----+----+-----+----+ ! Prox CCA       !67.8!11. 8!60   !0.83! +---------------+----+----+-----+----+ ! Mid CCA        !70.8!8.85!60   !0.88! +---------------+----+----+-----+----+ ! Dist CCA       !96. 9!14. 3!60   !0.85! +---------------+----+----+-----+----+ ! Prox ICA       !123 !19. 9!60   !0.84! +---------------+----+----+-----+----+ ! Mid ICA        !117 !17. 4!60   !0.85! +---------------+----+----+-----+----+ ! Dist ICA       !83. 2! 18  !60   !0.78! +---------------+----+----+-----+----+ ! Prox ECA       !141 !    !61   !    ! +---------------+----+----+-----+----+ ! Vertebral      !30.9!7.54!60   !0.76! +---------------+----+----+-----+----+ ! Prox Subclavian!91.1! ! 46   !    ! +---------------+----+----+-----+----+   - There is antegrade vertebral flow noted on the left side. - Additional Measurements:ICAPSV/CCAPSV 1.74. ICAEDV/CCAEDV 1.69.            Electronically signed by: APURVA Tom CNP, CNP 9/19/2023   [facility]  Office Contact: [phone]

## 2023-09-19 NOTE — ANESTHESIA POSTPROCEDURE EVALUATION
Department of Anesthesiology  Postprocedure Note    Patient: Abel Corado  MRN: [id number]  YOB: 1935  Date of evaluation: 9/19/2023      Procedure Summary     Date: 09/19/23 Room / Location: [address] [place] [address]    Anesthesia Start: [facility] Anesthesia Stop: 2092    Procedure: SIGMOIDOSCOPY DIAGNOSTIC FLEXIBLE Diagnosis:       Rectal bleed      (Rectal bleed [K62.5])    Surgeons: David Rodriguez MD Responsible Provider: Pramod Zhang MD    Anesthesia Type: MAC ASA Status: 3          Anesthesia Type: No value filed.     Ben Phase I: Ben Score: 10    Ben Phase II:        Anesthesia Post Evaluation    Patient location during evaluation: PACU  Patient participation: complete - patient participated  Level of consciousness: awake and alert  Airway patency: patent  Nausea & Vomiting: no nausea and no vomiting  Complications: no  Cardiovascular status: blood pressure returned to baseline  Respiratory status: acceptable  Hydration status: euvolemic  Multimodal analgesia pain management approach  Pain management: adequate

## 2023-09-19 NOTE — PLAN OF CARE
Problem: Discharge Planning  Goal: Discharge to home or other facility with appropriate resources  9/19/2023 1012 by Tiffani Cazares RN  Outcome: Progressing  9/19/2023 0225 by Bert Mccarty RN  Outcome: Progressing     Problem: Pain  Goal: Verbalizes/displays adequate comfort level or baseline comfort level  9/19/2023 1012 by Tiffani Cazares RN  Outcome: Progressing  9/19/2023 0225 by Bert Mccarty RN  Outcome: Progressing     Problem: Safety - Adult  Goal: Free from fall injury  9/19/2023 1012 by Tiffani Cazares RN  Outcome: Progressing  9/19/2023 0225 by Bert Mccarty RN  Outcome: Progressing     Problem: ABCDS Injury Assessment  Goal: Absence of physical injury  9/19/2023 1012 by Tiffani Cazares RN  Outcome: Progressing  9/19/2023 0225 by Bert Mccarty RN  Outcome: Progressing     Problem: Chronic Conditions and Co-morbidities  Goal: Patient's chronic conditions and co-morbidity symptoms are monitored and maintained or improved  Outcome: Progressing

## 2023-09-19 NOTE — PLAN OF CARE
Problem: Discharge Planning  Goal: Discharge to home or other facility with appropriate resources  9/19/2023 1148 by Kimberley Blackman RN  Outcome: Progressing  Flowsheets (Taken 9/19/2023 1018)  Discharge to home or other facility with appropriate resources:   Identify barriers to discharge with patient and caregiver   Arrange for needed discharge resources and transportation as appropriate   Identify discharge learning needs (meds, wound care, etc)   Arrange for interpreters to assist at discharge as needed   Refer to discharge planning if patient needs post-hospital services based on physician order or complex needs related to functional status, cognitive ability or social support system  9/19/2023 1012 by Kimberley Blackman RN  Outcome: Progressing  9/19/2023 0225 by Michaela Crum RN  Outcome: Progressing     Problem: Pain  Goal: Verbalizes/displays adequate comfort level or baseline comfort level  9/19/2023 1148 by Kimberley Blackman RN  Outcome: Progressing  9/19/2023 1012 by Kimberley Blackman RN  Outcome: Progressing  9/19/2023 0225 by Michaela Crum RN  Outcome: Progressing     Problem: Safety - Adult  Goal: Free from fall injury  9/19/2023 1148 by Kimberley Blackman RN  Outcome: Progressing  9/19/2023 1012 by Kimberley Blackman RN  Outcome: Progressing  9/19/2023 0225 by Michaela Crum RN  Outcome: Progressing     Problem: ABCDS Injury Assessment  Goal: Absence of physical injury  9/19/2023 1148 by Kimberley Blackman RN  Outcome: Progressing  9/19/2023 1012 by Kimberley Blackman RN  Outcome: Progressing  9/19/2023 0225 by Michaela Crum RN  Outcome: Progressing     Problem: Chronic Conditions and Co-morbidities  Goal: Patient's chronic conditions and co-morbidity symptoms are monitored and maintained or improved  9/19/2023 1148 by Kimberley Blackman RN  Outcome: Progressing  Flowsheets (Taken 9/19/2023 1018)  Care Plan - Patient's Chronic Conditions and Co-Morbidity Symptoms are Monitored and Maintained or Improved:   Monitor and

## 2023-09-19 NOTE — H&P
[address] [facility]                     [address], [address]                              HISTORY AND PHYSICAL    PATIENT NAME: Sarah Villanueva                   :        1935  MED REC NO:   [id number]                          ROOM:       5340  ACCOUNT NO:   [de-identified]                           ADMIT DATE: 2023  PROVIDER:     Javad Branch MD    HISTORY OF PRESENT ILLNESS:  The patient is an 82-year-old white  American man came to the emergency room with history of rectal bleeding. The patient has been constipated lately and he has lost about 50 pounds  of weight, although he claims that is all because of his effort. His  bowels have been constipated lately. He was trying to manually  disimpact himself and it triggered some injury resulting into bright red  blood per rectum. He put on a glove and tried to manually disimpact  himself. He stated that he then broke open the floodgate and reports  copious amount of bright red blood. The patient is not on any oral  anticoagulation at this time. The patient denied any abdominal pain. There is no nausea or vomiting. No fever, no chills. He does feel that  there is something blocking his rectum, which could be even cancer. His  last colonoscopy was more than 10 years ago. He does not have any other  complaint at this time. Denies any chest pain or shortness of breath. PAST MEDICAL HISTORY:  Pertinent for congestive heart failure,  congenital heart disease, type 2 diabetes mellitus, hyperlipidemia,  hypertension, and obstructive sleep apnea. PAST SURGICAL HISTORY:  Pertinent for angioplasty, appendectomy, cyst  removal and PTCA. FAMILY HISTORY:  Both the parents are . Father had colon cancer  and atherosclerotic heart disease. The patient also had a sister who   of colon cancer. His maternal grandmother had diabetes.     MEDICATIONS:  The patient is on oxycodone, potassium,

## 2023-09-19 NOTE — ED NOTES
..ED TO INPATIENT SBAR HANDOFF    Patient Name: Sarah Villanueva   :  1935  82 y.o. MRN:  [id number]  Preferred Name  [facility]  ED Room #:  ED-0010/10  Family/Caregiver Present yes   Restraints no   Sitter no   Sepsis Risk Score Sepsis Risk Score: 0.97    Situation  Code Status: FULL CODE. Allergies: Lisinopril and Simvastatin  Weight: No data found. Arrived from: nursing home  Chief Complaint:   Chief Complaint   Patient presents with    Rectal Bleeding     Pt arrived via squad from [facility]. Rectal bleeding started today. Hospital Problem/Diagnosis:  Principal Problem:    Rectal bleeding  Resolved Problems:    * No resolved hospital problems. *    Imaging:   CTA ABDOMEN PELVIS W WO CONTRAST   Final Result   1. No evidence of acute GI bleed. 2. Diverticulosis with no evidence of acute diverticulitis. 3. Atherosclerotic changes of aorta and branching vessels are detailed above. 4. Progressive interstitial changes in the visualized lungs. 5. Moderate bilateral hydroureteronephrosis and distention of the bladder. Bladder outlet obstruction and vesicoureteral reflux may be considered. Correlate with urinalysis for any signs of cystitis.            Abnormal labs:   Abnormal Labs Reviewed   CBC WITH AUTO DIFFERENTIAL - Abnormal; Notable for the following components:       Result Value    Monocytes Absolute 1.4 (*)     All other components within normal limits   COMPREHENSIVE METABOLIC PANEL - Abnormal; Notable for the following components:    Potassium 2.4 (*)     Chloride 93 (*)     Glucose 150 (*)     BUN 21 (*)     All other components within normal limits    Narrative:     CALL  Louie  [facility] tel. [phone],  Chemistry results called to and read back by Milagro Ruiz, 2023  12:42, by [address] - Abnormal; Notable for the following components:    Blood, Urine SMALL (*)     Protein,  (*)     All other components within normal limits     Critical
Administration: no       You may also review the ED PT Care Timeline found under the Summary Nursing Index tab. Recommendation    Pending orders ED orders complete  Plan for Discharge (if known):    Additional Comments:    If any further questions, please call Sending RN at ED    Electronically signed by: Electronically signed by Margret Martins RN on 9/18/2023 at 8:08 PM      Margret Martins RN  09/18/23 2011

## 2023-09-19 NOTE — PROGRESS NOTES
[address]  [phone]  9/27/23  Referring:     REASON FOR CONSULT/CHIEF COMPLAINT/HPI     Reason for visit/ Chief complaint  Short of breath, leg swelling   HPI Eli Lipscomb is a 82 y.o. male patient here for follow up. He was initially seen by referral from Dr Gwendolyn Medina for Shortness of Breath and Swelling. At last visit, I d/c'd his losartan and started Imdur for empiric treatment of his coronary obstructive disease. I had advised him that he likely would have a blockage and likely need a stent if we cathed him, but would also be at high risk for cath complications, given age and comorbidities, so we chose to go the medical management route for now. I also placed him on torsemide for leg swelling. Last week he was in the ER for fecal compaction and found to be hypokalemic, and was admitted. Torsemide was stopped. He had urinary retention and was started on Flomax, Proscar, and antibiotics. He was manually disimpacted and had diarrhea. He was admitted recently for rectal bleeding and hypokalemia. He had a colonoscopy while inpatient which showed ulcers, hemorrhoids and small fissure, but otherwise normal rectum/colon. He and his wife are very confused about all their discharge papers but fortunately brought a large bag with all the different medications and discharge summaries. For review, in 2021 he had large bilateral pulmonary emboli. He has known coronary disease and had POBA in the late 80s/early 90s before stents were even commercially available. His last stress test was 2015 at [facility] which was normal.    He was admitted 1/25/2023 to [facility] with RLE cellulitis. Echo was done during that admission showing normal LVEF, stage 1 DD, and aortic sclerosis without stenosis. He was treated with IV ABX with negative cultures. Today he is here with his wife. He is in a wheelchair. He states he feels a lot better than he did last week.  He denies CP, SOB with

## 2023-09-19 NOTE — PROCEDURES
Flexible Sigmoidoscopy Procedure Note     Patient: Fabián Olmos MRN: [id number]   YOB: 1935 Age: 82 y.o. Sex: male   Unit: [facility] ENDOSCOPY Room/Bed: [facility] ENDO/NONE Location: [address] [place] [address]    Admitting Physician: Angelica Lazar     Primary Care Physician: Cece Gant MD      DATE OF PROCEDURE: 9/19/2023  PROCEDURE: Colonoscopy    PREOPERATIVE DIAGNOSIS: Rectal bleed [K62.5]  HPI: This is a 82y.o. year old male admitted with rectal bleeding. He has struggled with significant constipation for some time. He came in after trying to digitally disimpact himself, triggering a bloody bowel movement. His hemoglobin is normal.    ENDOSCOPIST: Otilia Paris MD    POSTOPERATIVE DIAGNOSIS:    -Clean-based ulcers at the dentate line, consistent with stercoral ulcers. -External hemorrhoids and a small posterior anal fissure.  -Otherwise normal rectum, sigmoid and distal descending colon, with brown stool in the mid descending colon. Most likely, he bled from the stercoral ulcer but anal fissure or hemorrhoid might of bled as well with digital rectal stimulation. PLAN:   -Continue MiraLAX twice a day till bowel movement start, then may decrease to once a day if needed.  -Okay for discharge per primary team.    INFORMED CONSENT:  Informed consent for colonoscopy was obtained. The benefits and risks including adverse medicine reaction and perforation have been explained. The patient's questions were answered and the patient agreed to proceed. ASA: ASA 2 - Patient with mild systemic disease with no functional limitations     SEDATION: MAC    The patient's vital signs, cardiac status, pulmonary status, abdominal status and mental status were stable for the procedure. The patient's vital signs and respiratory function as monitored by oxygen saturation remained stable. Procedure Details: An anal exam was performed and this was unremarkable.  A digital rectal exam was

## 2023-09-20 VITALS
OXYGEN SATURATION: 94 % | WEIGHT: 217 LBS | BODY MASS INDEX: 30.38 KG/M2 | SYSTOLIC BLOOD PRESSURE: 126 MMHG | TEMPERATURE: 98 F | DIASTOLIC BLOOD PRESSURE: 55 MMHG | HEART RATE: 72 BPM | HEIGHT: 71 IN | RESPIRATION RATE: 18 BRPM

## 2023-09-20 LAB
ANION GAP SERPL CALCULATED.3IONS-SCNC: 10 MMOL/L (ref 3–16)
ANION GAP SERPL CALCULATED.3IONS-SCNC: 11 MMOL/L (ref 3–16)
BUN SERPL-MCNC: 10 MG/DL (ref 7–20)
BUN SERPL-MCNC: 13 MG/DL (ref 7–20)
CALCIUM SERPL-MCNC: 9.2 MG/DL (ref 8.3–10.6)
CALCIUM SERPL-MCNC: 9.7 MG/DL (ref 8.3–10.6)
CHLORIDE SERPL-SCNC: 97 MMOL/L (ref 99–110)
CHLORIDE SERPL-SCNC: 99 MMOL/L (ref 99–110)
CO2 SERPL-SCNC: 27 MMOL/L (ref 21–32)
CO2 SERPL-SCNC: 29 MMOL/L (ref 21–32)
CREAT SERPL-MCNC: 0.7 MG/DL (ref 0.8–1.3)
CREAT SERPL-MCNC: 0.8 MG/DL (ref 0.8–1.3)
DEPRECATED RDW RBC AUTO: 13.5 % (ref 12.4–15.4)
GFR SERPLBLD CREATININE-BSD FMLA CKD-EPI: >60 ML/MIN/{1.73_M2}
GFR SERPLBLD CREATININE-BSD FMLA CKD-EPI: >60 ML/MIN/{1.73_M2}
GLUCOSE SERPL-MCNC: 115 MG/DL (ref 70–99)
GLUCOSE SERPL-MCNC: 142 MG/DL (ref 70–99)
HCT VFR BLD AUTO: 36.9 % (ref 40.5–52.5)
HCT VFR BLD AUTO: 38.3 % (ref 40.5–52.5)
HCT VFR BLD AUTO: 39 % (ref 40.5–52.5)
HGB BLD-MCNC: 12.9 G/DL (ref 13.5–17.5)
HGB BLD-MCNC: 13.2 G/DL (ref 13.5–17.5)
HGB BLD-MCNC: 13.4 G/DL (ref 13.5–17.5)
MCH RBC QN AUTO: 32.9 PG (ref 26–34)
MCHC RBC AUTO-ENTMCNC: 34.4 G/DL (ref 31–36)
MCV RBC AUTO: 95.7 FL (ref 80–100)
PLATELET # BLD AUTO: 227 K/UL (ref 135–450)
PMV BLD AUTO: 8.4 FL (ref 5–10.5)
POTASSIUM SERPL-SCNC: 2.8 MMOL/L (ref 3.5–5.1)
POTASSIUM SERPL-SCNC: 3.4 MMOL/L (ref 3.5–5.1)
RBC # BLD AUTO: 4 M/UL (ref 4.2–5.9)
SODIUM SERPL-SCNC: 136 MMOL/L (ref 136–145)
SODIUM SERPL-SCNC: 137 MMOL/L (ref 136–145)
WBC # BLD AUTO: 9.7 K/UL (ref 4–11)

## 2023-09-20 PROCEDURE — 6360000002 HC RX W HCPCS: Performed by: INTERNAL MEDICINE

## 2023-09-20 PROCEDURE — 6370000000 HC RX 637 (ALT 250 FOR IP): Performed by: INTERNAL MEDICINE

## 2023-09-20 PROCEDURE — 80048 BASIC METABOLIC PNL TOTAL CA: CPT

## 2023-09-20 PROCEDURE — 36415 COLL VENOUS BLD VENIPUNCTURE: CPT

## 2023-09-20 PROCEDURE — 85014 HEMATOCRIT: CPT

## 2023-09-20 PROCEDURE — 2580000003 HC RX 258: Performed by: INTERNAL MEDICINE

## 2023-09-20 PROCEDURE — 6370000000 HC RX 637 (ALT 250 FOR IP): Performed by: NURSE PRACTITIONER

## 2023-09-20 PROCEDURE — 51798 US URINE CAPACITY MEASURE: CPT

## 2023-09-20 PROCEDURE — 85018 HEMOGLOBIN: CPT

## 2023-09-20 PROCEDURE — 85027 COMPLETE CBC AUTOMATED: CPT

## 2023-09-20 RX ORDER — POLYETHYLENE GLYCOL 3350 17 G/17G
17 POWDER, FOR SOLUTION ORAL DAILY
Status: DISCONTINUED | OUTPATIENT
Start: 2023-09-20 | End: 2023-09-20 | Stop reason: HOSPADM

## 2023-09-20 RX ORDER — TAMSULOSIN HYDROCHLORIDE 0.4 MG/1
0.4 CAPSULE ORAL DAILY
Qty: 30 CAPSULE | Refills: 3 | Status: SHIPPED | OUTPATIENT
Start: 2023-09-21

## 2023-09-20 RX ORDER — FINASTERIDE 5 MG/1
5 TABLET, FILM COATED ORAL DAILY
Qty: 30 TABLET | Refills: 3 | Status: SHIPPED | OUTPATIENT
Start: 2023-09-21

## 2023-09-20 RX ORDER — CIPROFLOXACIN 500 MG/1
500 TABLET, FILM COATED ORAL EVERY 12 HOURS SCHEDULED
Qty: 14 TABLET | Refills: 0 | Status: SHIPPED | OUTPATIENT
Start: 2023-09-20 | End: 2023-09-27

## 2023-09-20 RX ORDER — POLYETHYLENE GLYCOL 3350 17 G/17G
17 POWDER, FOR SOLUTION ORAL DAILY
Qty: 527 G | Refills: 1 | COMMUNITY
Start: 2023-09-21 | End: 2023-11-20

## 2023-09-20 RX ORDER — SENNA AND DOCUSATE SODIUM 50; 8.6 MG/1; MG/1
2 TABLET, FILM COATED ORAL DAILY
Qty: 60 TABLET | Refills: 2 | Status: SHIPPED | OUTPATIENT
Start: 2023-09-21

## 2023-09-20 RX ORDER — SENNA AND DOCUSATE SODIUM 50; 8.6 MG/1; MG/1
2 TABLET, FILM COATED ORAL DAILY
Status: DISCONTINUED | OUTPATIENT
Start: 2023-09-20 | End: 2023-09-20 | Stop reason: HOSPADM

## 2023-09-20 RX ADMIN — SODIUM CHLORIDE, POTASSIUM CHLORIDE, SODIUM LACTATE AND CALCIUM CHLORIDE: 600; 310; 30; 20 INJECTION, SOLUTION INTRAVENOUS at 00:52

## 2023-09-20 RX ADMIN — ASPIRIN 81 MG: 81 TABLET, CHEWABLE ORAL at 08:45

## 2023-09-20 RX ADMIN — POTASSIUM BICARBONATE 50 MEQ: 978 TABLET, EFFERVESCENT ORAL at 08:45

## 2023-09-20 RX ADMIN — ATORVASTATIN CALCIUM 80 MG: 80 TABLET, FILM COATED ORAL at 08:45

## 2023-09-20 RX ADMIN — METFORMIN HYDROCHLORIDE 500 MG: 500 TABLET ORAL at 08:45

## 2023-09-20 RX ADMIN — FINASTERIDE 5 MG: 5 TABLET, FILM COATED ORAL at 08:45

## 2023-09-20 RX ADMIN — SENNOSIDES AND DOCUSATE SODIUM 2 TABLET: 50; 8.6 TABLET ORAL at 08:45

## 2023-09-20 RX ADMIN — TAMSULOSIN HYDROCHLORIDE 0.4 MG: 0.4 CAPSULE ORAL at 08:45

## 2023-09-20 RX ADMIN — POLYETHYLENE GLYCOL 3350 17 G: 17 POWDER, FOR SOLUTION ORAL at 08:45

## 2023-09-20 RX ADMIN — CIPROFLOXACIN 500 MG: 500 TABLET, FILM COATED ORAL at 08:45

## 2023-09-20 RX ADMIN — POTASSIUM CHLORIDE 10 MEQ: 7.46 INJECTION, SOLUTION INTRAVENOUS at 05:55

## 2023-09-20 RX ADMIN — POTASSIUM BICARBONATE 50 MEQ: 978 TABLET, EFFERVESCENT ORAL at 12:45

## 2023-09-20 RX ADMIN — ISOSORBIDE MONONITRATE 30 MG: 30 TABLET, EXTENDED RELEASE ORAL at 08:45

## 2023-09-20 ASSESSMENT — PAIN DESCRIPTION - LOCATION
LOCATION: ARM
LOCATION: ARM

## 2023-09-20 ASSESSMENT — PAIN SCALES - GENERAL
PAINLEVEL_OUTOF10: 5
PAINLEVEL_OUTOF10: 0
PAINLEVEL_OUTOF10: 0
PAINLEVEL_OUTOF10: 1

## 2023-09-20 ASSESSMENT — PAIN DESCRIPTION - ORIENTATION
ORIENTATION: RIGHT
ORIENTATION: RIGHT

## 2023-09-20 ASSESSMENT — PAIN DESCRIPTION - PAIN TYPE
TYPE: ACUTE PAIN
TYPE: ACUTE PAIN

## 2023-09-20 NOTE — CARE COORDINATION
Case Management Assessment  Initial Evaluation    Date/Time of Evaluation: 9/20/2023 12:02 PM  Assessment Completed by: Waylon Santana RN    If patient is discharged prior to next notation, then this note serves as note for discharge by case management. Patient Name: Fabián Olmos                   YOB: 1935  Diagnosis: Hypokalemia [E87.6]  Rectal bleeding [K62.5]  Retention of urine [R33.9]  Rectal bleed [K62.5]                   Date / Time: 9/18/2023 11:28 AM    Patient Admission Status: Inpatient   Readmission Risk (Low < 19, Mod (19-27), High > 27): Readmission Risk Score: 12    Current PCP: Cece Gant MD  PCP verified by CM? Yes    Chart Reviewed: Yes      History Provided by: Patient  Patient Orientation: Alert and Oriented    Patient Cognition: Alert    Hospitalization in the last 30 days (Readmission):  No    If yes, Readmission Assessment in  Navigator will be completed. Advance Directives:      Code Status: Full Code   Patient's Primary Decision Maker is: Legal Next of Kin      Discharge Planning:    Patient lives with: Spouse/Significant Other Type of Home: House  Primary Care Giver: Self  Patient Support Systems include: Spouse/Significant Other, Children   Current Financial resources: Medicare  Current community resources: Other (Comment) ([facility])  Current services prior to admission: [facility], Other (Comment) ([facility] skilled nurse weekly through the [place])            Current DME:  walker and electric scooter            Type of Home Care services:  [facility]  ([place] Provider)  Phone: [phone]  Fax: [phone] and confirmed that patient is a client with [facility] with  [employer] [phone]. They will resume care. Faxed [facility] to [phone]. ADLS  Prior functional level: Independent in ADLs/IADLs, Other (see comment) (independent with use of a walker and electric scooter)  Current functional level:  Independent in

## 2023-09-20 NOTE — PLAN OF CARE
Problem: Pain  Goal: Verbalizes/displays adequate comfort level or baseline comfort level  9/20/2023 0908 by Velia Mccord RN  Outcome: Progressing  Note: Patient states pain to infiltrated IV site slowly improving, no other c/o pain at this time. Will continue to monitor. Problem: Safety - Adult  Goal: Free from fall injury  9/20/2023 0908 by Velia Mccord RN  Outcome: Progressing  Note: Patient remains absent from falls at this time. Remains alert and oriented, in bed with call light and belongings in reach. Non-slip footwear on and 2/4 siderails raised. Bed remains in lowest/locked position at all times with alarm activated. Fall precautions in place. Camera in room. Patient encouraged to use call light to request assistance, v/u.  Will continue to monitor.

## 2023-09-20 NOTE — DISCHARGE INSTR - COC
Colostomy/Ileostomy/Ileal Conduit: No       Date of Last BM: 9/19/2023    Intake/Output Summary (Last 24 hours) at 9/20/2023 1112  Last data filed at 9/20/2023 0954  Gross per 24 hour   Intake 4332.29 ml   Output 2175 ml   Net 2157.29 ml     I/O last 3 completed shifts: In: 4146.1 [P.O.:360; I.V.:2990.4; IV Piggyback:795.8]  Out: 2650 [Urine:2650]    Safety Concerns: At Risk for Falls    Impairments/Disabilities:      Vision and Hearing    Nutrition Therapy:  Current Nutrition Therapy:   - Oral Diet:  General    Routes of Feeding: Oral  Liquids: Thin Liquids  Daily Fluid Restriction: no  Last Modified Barium Swallow with Video (Video Swallowing Test): not done    Treatments at the Time of Hospital Discharge:   Respiratory Treatments: N/A  Oxygen Therapy:  is not on home oxygen therapy. Ventilator:    - No ventilator support    Rehab Therapies: N/A  Weight Bearing Status/Restrictions: No weight bearing restrictions  Other Medical Equipment (for information only, NOT a DME order):  N/A  Other Treatments: N/A      RN SIGNATURE:  Electronically signed by Bienvenido Gibson RN on 9/20/23 at 2:10 PM EDT    CASE MANAGEMENT/SOCIAL WORK SECTION    Inpatient Status Date: 9-    Readmission Risk Assessment Score: 12      Discharging to Facility/ Agency   Physician Noemí  ([place] Provider)  Phone: [phone]  Fax: [phone]    / signature: Electronically signed by Gordo Glaser RN on 9/20/23 at 12:18 PM EDT    PHYSICIAN SECTION    Prognosis: Fair    Condition at Discharge: Stable    Rehab Potential (if transferring to Rehab): Fair    Recommended Labs or Other Treatments After Discharge: nurse visits [address]   BMP in 3 days , urology and GI follow up     Physician Certification: I certify the above information and transfer of Lima Saini  is necessary for the continuing treatment of the diagnosis listed and that he requires [address] [place] [address] for less 30 days.      Update Admission

## 2023-09-20 NOTE — DISCHARGE INSTRUCTIONS
Your information:  Name: Jaron Duran  : 1935    Your Discharge Instructions    What to do after you leave the hospital:    Read, review and familiarize yourself with the information provided below and in a separate packet on  GI Bleeding: Lower and Hypokalemia    Diet: Regular    Recommended activity:  Resume as tolerated  Avoid strenuous activity until instructed by your physician to resume; balance rest with periods of light to normal activity. If you experience any of the following: Unusual or inadequately controlled pain; unusual transient shortness of breath; recurrent or persistent nausea, heartburn, palpitations or lightheadedness; increased swelling; increased fatigue; fever >100; please follow up with Angi Anguiano MD or go to the Emergency Room.       Home Health/ Outpatient Services: Resume Skilled Nursing services through [facility]  obtained by:  By signing below, I understand and acknowledge receipt of the instructions indicated above, and I understand that if any problems occur once I leave the hospital I am to contact [facility] LAYTON Pearce MD.

## 2023-09-20 NOTE — PLAN OF CARE
Problem: Safety - Adult  Goal: Free from fall injury  Outcome: Progressing   Pt is a high fall risk. Pt remains free from falls, throughout night. Bed alarm remains in place, and camera  placed at bedside for safety monitoring. [facility]UC called to verify camera;camera on in room. Pt encouraged to use call light for needs throughout night; call light is within reach. Bed lock is in lowest position.  Will continue to monitor

## 2023-09-24 RX ORDER — ATENOLOL AND CHLORTHALIDONE TABLET 100; 25 MG/1; MG/1
0.5 TABLET ORAL DAILY
Qty: 15 TABLET | Refills: 0 | Status: SHIPPED | OUTPATIENT
Start: 2023-09-24 | End: 2023-10-24

## 2023-09-27 ENCOUNTER — OFFICE VISIT (OUTPATIENT)
Dept: CARDIOLOGY CLINIC | Age: 88
End: 2023-09-27
Payer: MEDICARE

## 2023-09-27 VITALS
WEIGHT: 226.4 LBS | SYSTOLIC BLOOD PRESSURE: 90 MMHG | BODY MASS INDEX: 31.69 KG/M2 | OXYGEN SATURATION: 95 % | HEIGHT: 71 IN | DIASTOLIC BLOOD PRESSURE: 40 MMHG | HEART RATE: 59 BPM

## 2023-09-27 DIAGNOSIS — Z79.899 POLYPHARMACY: ICD-10-CM

## 2023-09-27 DIAGNOSIS — Z86.73 OLD CEREBROVASCULAR ACCIDENT (CVA) WITHOUT LATE EFFECT: ICD-10-CM

## 2023-09-27 DIAGNOSIS — Z86.711 HISTORY OF PULMONARY EMBOLISM: ICD-10-CM

## 2023-09-27 DIAGNOSIS — R60.0 BILATERAL LOWER EXTREMITY EDEMA: ICD-10-CM

## 2023-09-27 DIAGNOSIS — I50.32 CHRONIC DIASTOLIC HEART FAILURE (HCC): ICD-10-CM

## 2023-09-27 DIAGNOSIS — E66.9 OBESITY (BMI 30-39.9): ICD-10-CM

## 2023-09-27 DIAGNOSIS — I65.29 STENOSIS OF CAROTID ARTERY, UNSPECIFIED LATERALITY: ICD-10-CM

## 2023-09-27 DIAGNOSIS — K55.1 SUPERIOR MESENTERIC ARTERY STENOSIS (HCC): ICD-10-CM

## 2023-09-27 DIAGNOSIS — E78.5 HYPERLIPIDEMIA, UNSPECIFIED HYPERLIPIDEMIA TYPE: ICD-10-CM

## 2023-09-27 DIAGNOSIS — R06.02 SOB (SHORTNESS OF BREATH): Primary | ICD-10-CM

## 2023-09-27 DIAGNOSIS — I10 ESSENTIAL HYPERTENSION: ICD-10-CM

## 2023-09-27 DIAGNOSIS — R09.89 BILATERAL CAROTID BRUITS: ICD-10-CM

## 2023-09-27 DIAGNOSIS — I35.8 AORTIC VALVE SCLEROSIS: ICD-10-CM

## 2023-09-27 DIAGNOSIS — E11.9 TYPE 2 DIABETES MELLITUS WITHOUT COMPLICATION, WITHOUT LONG-TERM CURRENT USE OF INSULIN (HCC): ICD-10-CM

## 2023-09-27 PROCEDURE — G8417 CALC BMI ABV UP PARAM F/U: HCPCS | Performed by: INTERNAL MEDICINE

## 2023-09-27 PROCEDURE — 99215 OFFICE O/P EST HI 40 MIN: CPT | Performed by: INTERNAL MEDICINE

## 2023-09-27 PROCEDURE — G8427 DOCREV CUR MEDS BY ELIG CLIN: HCPCS | Performed by: INTERNAL MEDICINE

## 2023-09-27 PROCEDURE — 1123F ACP DISCUSS/DSCN MKR DOCD: CPT | Performed by: INTERNAL MEDICINE

## 2023-09-27 PROCEDURE — 1036F TOBACCO NON-USER: CPT | Performed by: INTERNAL MEDICINE

## 2023-09-27 PROCEDURE — G2212 PROLONG OUTPT/OFFICE VIS: HCPCS | Performed by: INTERNAL MEDICINE

## 2023-09-27 PROCEDURE — 1111F DSCHRG MED/CURRENT MED MERGE: CPT | Performed by: INTERNAL MEDICINE

## 2023-10-10 ENCOUNTER — HOSPITAL ENCOUNTER (OUTPATIENT)
Dept: NON INVASIVE DIAGNOSTICS | Age: 88
Discharge: HOME | End: 2023-10-10
Payer: MEDICARE

## 2023-10-10 DIAGNOSIS — I35.8 AORTIC VALVE SCLEROSIS: ICD-10-CM

## 2023-10-10 PROCEDURE — 93306 TTE W/DOPPLER COMPLETE: CPT

## 2023-10-12 ENCOUNTER — TELEPHONE (OUTPATIENT)
Dept: CARDIOLOGY CLINIC | Age: 88
End: 2023-10-12

## 2023-10-12 NOTE — TELEPHONE ENCOUNTER
----- Message from Sloan Chahal MD sent at 10/12/2023 12:24 PM EDT -----  Echo stable from before. Aortic valve a little thick but still opens normally.

## 2023-10-12 NOTE — TELEPHONE ENCOUNTER
----- Message from Todd Moreno MD sent at 10/12/2023 12:24 PM EDT -----  Echo stable from before. Aortic valve a little thick but still opens normally.

## 2024-03-25 ENCOUNTER — OFFICE VISIT (OUTPATIENT)
Dept: CARDIOLOGY CLINIC | Age: 89
End: 2024-03-25
Payer: MEDICARE

## 2024-03-25 ENCOUNTER — TELEPHONE (OUTPATIENT)
Dept: CARDIOLOGY CLINIC | Age: 89
End: 2024-03-25

## 2024-03-25 VITALS
WEIGHT: 212 LBS | SYSTOLIC BLOOD PRESSURE: 144 MMHG | DIASTOLIC BLOOD PRESSURE: 64 MMHG | HEART RATE: 64 BPM | OXYGEN SATURATION: 98 % | HEIGHT: 71 IN | BODY MASS INDEX: 29.68 KG/M2

## 2024-03-25 DIAGNOSIS — I10 ESSENTIAL HYPERTENSION: ICD-10-CM

## 2024-03-25 DIAGNOSIS — K55.1 SUPERIOR MESENTERIC ARTERY STENOSIS (HCC): ICD-10-CM

## 2024-03-25 DIAGNOSIS — E78.2 MIXED HYPERLIPIDEMIA: ICD-10-CM

## 2024-03-25 DIAGNOSIS — I50.32 CHRONIC DIASTOLIC HEART FAILURE (HCC): ICD-10-CM

## 2024-03-25 DIAGNOSIS — I65.23 BILATERAL CAROTID ARTERY STENOSIS: ICD-10-CM

## 2024-03-25 DIAGNOSIS — I25.10 ASHD (ARTERIOSCLEROTIC HEART DISEASE): Primary | ICD-10-CM

## 2024-03-25 PROCEDURE — 1123F ACP DISCUSS/DSCN MKR DOCD: CPT | Performed by: NURSE PRACTITIONER

## 2024-03-25 PROCEDURE — G8417 CALC BMI ABV UP PARAM F/U: HCPCS | Performed by: NURSE PRACTITIONER

## 2024-03-25 PROCEDURE — G8427 DOCREV CUR MEDS BY ELIG CLIN: HCPCS | Performed by: NURSE PRACTITIONER

## 2024-03-25 PROCEDURE — 1036F TOBACCO NON-USER: CPT | Performed by: NURSE PRACTITIONER

## 2024-03-25 PROCEDURE — 99214 OFFICE O/P EST MOD 30 MIN: CPT | Performed by: NURSE PRACTITIONER

## 2024-03-25 PROCEDURE — G8484 FLU IMMUNIZE NO ADMIN: HCPCS | Performed by: NURSE PRACTITIONER

## 2024-03-25 RX ORDER — POLYETHYLENE GLYCOL 3350 17 G/17G
17 POWDER, FOR SOLUTION ORAL DAILY
COMMUNITY

## 2024-03-25 RX ORDER — ATENOLOL AND CHLORTHALIDONE TABLET 100; 25 MG/1; MG/1
1 TABLET ORAL DAILY
COMMUNITY
Start: 2023-12-04 | End: 2024-03-25 | Stop reason: ALTCHOICE

## 2024-03-25 NOTE — PROGRESS NOTES
Patient is in stable condition. Provided discharge instructions and follow-up information with understanding verbalized. [facility]     Outpatient Follow Up Note    Fish Cristina is 88 y.o. male who presents today with a history of CAD s/p PTCA , s/p rotational atherectomy PCI .  His other hx includes: hypokalemia, bi pul emboli ; carotid artery stenosis; SMA stenosis following with vascular; GIB    CHIEF COMPLAINT / HPI:  Follow Up secondary to coronary artery disease    Subjective:   He denies significant chest pain. There is SOB when exercising or moving around too much. He exercises using foot peddles daily, does upper arm curls and weights; leg lifts.   The patient denies orthopnea/PND. The patient no longer has swelling in his legs/feet since wearing compression stockings. The patients weight is down 12# / 6 months by office scales . The patient is not experiencing palpitations or dizziness.     These symptoms show no change since the last OV.   With regard to medication therapy the patient has been compliant with prescribed regimen. They have tolerated therapy to date.     Past Medical History:   Diagnosis Date    CHF (congestive heart failure) (HCC) ?    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     Social History:    Social History     Tobacco Use   Smoking Status Former    Current packs/day: 0.00    Types: Cigarettes    Quit date: 2015    Years since quittin.2   Smokeless Tobacco Never     Current Medications:  Current Outpatient Medications   Medication Sig Dispense Refill    polyethylene glycol (MIRALAX) 17 g PACK packet Take 17 g by mouth daily      tamsulosin (FLOMAX) 0.4 MG capsule Take 1 capsule by mouth daily 30 capsule 3    isosorbide mononitrate (IMDUR) 30 MG extended release tablet Take 1 tablet by mouth daily 90 tablet 3    metFORMIN (GLUCOPHAGE) 500 MG tablet Take 1 tablet by mouth daily (with breakfast)      atorvastatin (LIPITOR) 80 MG tablet Take by mouth daily

## 2024-03-25 NOTE — TELEPHONE ENCOUNTER
Patients wife is calling to see if patient should be taking FLOMAX and finasteride.   Please advise thank you

## 2024-03-25 NOTE — PATIENT INSTRUCTIONS
Call back with the names, doses and frequency of the medicines you are currently taking    Appt in six months

## 2024-03-25 NOTE — TELEPHONE ENCOUNTER
Medication Refill    Medication needing refilled: finasteride (PROSCAR) 5 MG tablet                                                tamsulosin (FLOMAX) 0.4 MG capsule     Dosage of the medication: finasteride 5 MG                                             Tamsulosin 0.4 MG    How are you taking this medication (QD, BID, TID, QID, PRN):  finasteride 5 MG 1 a day  Tamsulosin 0.4 MG 1 a day    30 or 90 day supply called in: 90 on both    When will you run out of your medication: Out now    Which Pharmacy are we sending the medication to?:  [facility][id number] - [address] - P [phone] - F [phone]  [address] [place] [id number]  Phone: [phone]  Fax: [phone]     Medication Question/Concern    What is the name of the medication you need to speak with someone about?    Dosage of the medication:    How are you taking this medication:    What issues/concerns are you having with this medication:      Medication Samples    Medication:    Dosage of the medication:    How are you taking this medication (QD, BID, TID, QID, PRN):     in the office or Mail to your home?

## 2024-08-09 RX ORDER — ISOSORBIDE MONONITRATE 30 MG/1
30 TABLET, EXTENDED RELEASE ORAL DAILY
Qty: 90 TABLET | Refills: 3 | Status: SHIPPED | OUTPATIENT
Start: 2024-08-09

## 2024-09-25 ENCOUNTER — OFFICE VISIT (OUTPATIENT)
Dept: CARDIOLOGY CLINIC | Age: 89
End: 2024-09-25
Payer: MEDICARE

## 2024-09-25 VITALS
OXYGEN SATURATION: 96 % | BODY MASS INDEX: 28.42 KG/M2 | WEIGHT: 203 LBS | HEART RATE: 79 BPM | DIASTOLIC BLOOD PRESSURE: 56 MMHG | HEIGHT: 71 IN | SYSTOLIC BLOOD PRESSURE: 130 MMHG

## 2024-09-25 DIAGNOSIS — I25.10 ASHD (ARTERIOSCLEROTIC HEART DISEASE): Primary | ICD-10-CM

## 2024-09-25 DIAGNOSIS — K55.1 SUPERIOR MESENTERIC ARTERY STENOSIS (HCC): ICD-10-CM

## 2024-09-25 DIAGNOSIS — I65.23 BILATERAL CAROTID ARTERY STENOSIS: ICD-10-CM

## 2024-09-25 DIAGNOSIS — I10 ESSENTIAL HYPERTENSION: ICD-10-CM

## 2024-09-25 DIAGNOSIS — I50.32 CHRONIC DIASTOLIC HEART FAILURE (HCC): ICD-10-CM

## 2024-09-25 DIAGNOSIS — E78.2 MIXED HYPERLIPIDEMIA: ICD-10-CM

## 2024-09-25 PROCEDURE — 99214 OFFICE O/P EST MOD 30 MIN: CPT | Performed by: NURSE PRACTITIONER

## 2024-09-25 PROCEDURE — 1036F TOBACCO NON-USER: CPT | Performed by: NURSE PRACTITIONER

## 2024-09-25 PROCEDURE — G8427 DOCREV CUR MEDS BY ELIG CLIN: HCPCS | Performed by: NURSE PRACTITIONER

## 2024-09-25 PROCEDURE — G8417 CALC BMI ABV UP PARAM F/U: HCPCS | Performed by: NURSE PRACTITIONER

## 2024-09-25 PROCEDURE — 1123F ACP DISCUSS/DSCN MKR DOCD: CPT | Performed by: NURSE PRACTITIONER

## 2024-09-25 RX ORDER — ATORVASTATIN CALCIUM 40 MG/1
20 TABLET, FILM COATED ORAL DAILY
COMMUNITY

## 2024-09-25 NOTE — PROGRESS NOTES
[facility]     Outpatient Follow Up Note    Fish Cristina is 89 y.o. male who presents today with a history of CAD s/p PTCA , s/p rotational atherectomy PCI .  His other hx includes: hypokalemia, bi pul emboli ; carotid artery stenosis; SMA stenosis following with vascular; GIB    CHIEF COMPLAINT / HPI:  Follow Up secondary to coronary artery disease    Subjective:     He denies significant chest pain. There is SOB only when he does something to earn it.      He exercises using foot peddles daily, does upper arm curls and weights; leg lifts. He does it for 10 minutes. He had done 15-20 minutes and would get SOB. The patient denies orthopnea/PND. The patient no longer has swelling in his legs/feet since wearing compression stockings and wraps. The patients weight is down another 9# / 6 months for a total of 21# / 12 months by office scales . The patient is not experiencing palpitations or dizziness.     These symptoms show no change since the last OV.   With regard to medication therapy the patient has been compliant with prescribed regimen. They have tolerated therapy to date.     Past Medical History:   Diagnosis Date    CHF (congestive heart failure) (HCC) ?    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     Social History:    Social History     Tobacco Use   Smoking Status Former    Current packs/day: 0.00    Types: Cigarettes    Quit date: 2015    Years since quittin.7   Smokeless Tobacco Never     Current Medications:  Current Outpatient Medications   Medication Sig Dispense Refill    atorvastatin (LIPITOR) 40 MG tablet Take 0.5 tablets by mouth daily      isosorbide mononitrate (IMDUR) 30 MG extended release tablet Take 1 tablet by mouth daily 90 tablet 3    polyethylene glycol (MIRALAX) 17 g PACK packet Take 17 g by mouth daily      finasteride (PROSCAR) 5 MG tablet Take 1

## 2024-10-02 ENCOUNTER — TELEPHONE (OUTPATIENT)
Dept: CARDIOLOGY CLINIC | Age: 89
End: 2024-10-02

## 2024-10-02 RX ORDER — NITROGLYCERIN 0.4 MG/1
0.4 TABLET SUBLINGUAL EVERY 5 MIN PRN
Qty: 25 TABLET | Refills: 3 | Status: SHIPPED | OUTPATIENT
Start: 2024-10-02

## 2024-10-02 NOTE — TELEPHONE ENCOUNTER
Medication Refill    Medication needing refilled:  nitroGLYCERIN (NITROSTAT   Dosage of the medication:  0.4 MG SL tablet   How are you taking this medication (QD, BID, TID, QID, PRN):    30 or 90 day supply called in:    When will you run out of your medication:    Which Pharmacy are we sending the medication to?:  [facility] #[id number] - [address] -  [phone] - F [phone]  [address] [place] [id number]  Phone: [phone]  Fax: [phone]

## 2024-10-15 ENCOUNTER — TELEPHONE (OUTPATIENT)
Dept: CARDIOLOGY CLINIC | Age: 89
End: 2024-10-15

## 2024-10-15 NOTE — TELEPHONE ENCOUNTER
The patient's spouse  Rosemary (HIPAA) phoned asking if they could have a copy of the AVS for his visit on 09/25/24 with NPTS mailed or emailed to them. They would gets points for his visit. She tried to print from his MYCHART and would come out gray.  Please advise.  Thank you    Please call Rosemary with any questions.

## 2025-03-19 NOTE — PROGRESS NOTES
[facility]      Cardiology Follow Up  [phone]  3/24/25  Referring:     REASON FOR CONSULT/CHIEF COMPLAINT/HPI     Reason for visit/ Chief complaint  Short of breath, leg swelling   HPI Fish Cristina is a 89 y.o. male patient here for follow up.     Past medical history of CAD s/p PTCA '87, s/p rotational atherectomy PCI '94.  His other hx includes: hypokalemia, bi pul emboli '21; carotid artery stenosis; SMA stenosis following with vascular; GIB     He is a former smoker, quit in 2015.    For review, in 2021 he had large bilateral pulmonary emboli.  He has known coronary disease and had POBA in the late 80s/early 90s before stents were even commercially available.  His last stress test was 2015 at [facility] which was normal.    1/25/2023- Echo was done during that admission showing normal LVEF, stage 1 DD, and aortic sclerosis without stenosis.  He was treated with IV ABX with negative cultures.    In 2023 he was admitted several times and found to be hypokalemic.     Today, he is here with his wife. He is in a wheelchair in order to get to the office. He denies any CP, SOB, palpitations or dizziness at this time.     Patient is adherent with medications and is tolerating them well without side effects     HISTORY/ALLERGIES/ROS     MedHx:  has a past medical history of CHF (congestive heart failure) (HCC), Congenital heart disease, Diabetes mellitus (HCC), Hyperlipidemia, Hypertension, and Sleep apnea.  SurgHx:  has a past surgical history that includes angioplasty; Appendectomy; cyst removal; Percutaneous Transluminal Coronary Angio (Age [age]); and sigmoidoscopy (N/A, 9/19/2023).   SocHx:  reports that he quit smoking about 10 years ago. His smoking use included cigarettes. He has never used smokeless tobacco. He reports current alcohol use of about 1.0 standard drink of alcohol per week. He reports that he does not use drugs.   FamHx:   Family History   Problem Relation Age of Onset    Heart

## 2025-03-20 PROBLEM — K55.1 SUPERIOR MESENTERIC ARTERY STENOSIS: Status: ACTIVE | Noted: 2025-03-20

## 2025-03-20 PROBLEM — Z79.899 POLYPHARMACY: Status: ACTIVE | Noted: 2025-03-20

## 2025-03-24 ENCOUNTER — OFFICE VISIT (OUTPATIENT)
Dept: CARDIOLOGY CLINIC | Age: 89
End: 2025-03-24
Payer: MEDICARE

## 2025-03-24 VITALS
OXYGEN SATURATION: 98 % | BODY MASS INDEX: 27.02 KG/M2 | WEIGHT: 193 LBS | HEART RATE: 72 BPM | HEIGHT: 71 IN | SYSTOLIC BLOOD PRESSURE: 128 MMHG | DIASTOLIC BLOOD PRESSURE: 70 MMHG

## 2025-03-24 DIAGNOSIS — E78.2 MIXED HYPERLIPIDEMIA: ICD-10-CM

## 2025-03-24 DIAGNOSIS — I10 ESSENTIAL HYPERTENSION: Primary | ICD-10-CM

## 2025-03-24 DIAGNOSIS — I50.32 CHRONIC DIASTOLIC HEART FAILURE (HCC): ICD-10-CM

## 2025-03-24 DIAGNOSIS — I25.10 CAD IN NATIVE ARTERY: ICD-10-CM

## 2025-03-24 PROCEDURE — 99214 OFFICE O/P EST MOD 30 MIN: CPT | Performed by: INTERNAL MEDICINE

## 2025-03-24 PROCEDURE — G2211 COMPLEX E/M VISIT ADD ON: HCPCS | Performed by: INTERNAL MEDICINE

## 2025-03-24 PROCEDURE — 1123F ACP DISCUSS/DSCN MKR DOCD: CPT | Performed by: INTERNAL MEDICINE

## 2025-03-24 PROCEDURE — 93000 ELECTROCARDIOGRAM COMPLETE: CPT | Performed by: INTERNAL MEDICINE

## 2025-03-24 PROCEDURE — 1159F MED LIST DOCD IN RCRD: CPT | Performed by: INTERNAL MEDICINE

## 2025-07-31 RX ORDER — ISOSORBIDE MONONITRATE 30 MG/1
30 TABLET, EXTENDED RELEASE ORAL DAILY
Qty: 90 TABLET | Refills: 3 | Status: SHIPPED | OUTPATIENT
Start: 2025-07-31

## 2025-07-31 NOTE — TELEPHONE ENCOUNTER
Isosorbide Mononitrate 30 mg    Last OV: 3/24/2025  Last labs/ EKG: ECG 03/24/2025  Appt scheduled :  Return in one year

## (undated) DEVICE — VALVE SUCTION AIR H2O SET ORCA POD + DISP

## (undated) DEVICE — AIR/WATER CLEANING ADAPTER FOR OLYMPUS® GI ENDOSCOPE: Brand: BULLDOG®

## (undated) DEVICE — BW-412T DISP COMBO CLEANING BRUSH: Brand: SINGLE USE COMBINATION CLEANING BRUSH

## (undated) DEVICE — GOWN AURORA NONREINF LG: Brand: MEDLINE INDUSTRIES, INC.

## (undated) DEVICE — ENDOSCOPIC KIT 6X3/16 FT COLON W/ 1.1 OZ 2 GWN W/O BRSH

## (undated) DEVICE — SOLUTION IV IRRIG WATER 500ML POUR BRL ST 2F7113